# Patient Record
Sex: MALE | Race: OTHER | Employment: UNEMPLOYED | URBAN - METROPOLITAN AREA
[De-identification: names, ages, dates, MRNs, and addresses within clinical notes are randomized per-mention and may not be internally consistent; named-entity substitution may affect disease eponyms.]

---

## 2021-03-15 RX ORDER — ATORVASTATIN CALCIUM 40 MG/1
40 TABLET, FILM COATED ORAL NIGHTLY
COMMUNITY
End: 2021-03-15 | Stop reason: CLARIF

## 2021-03-15 RX ORDER — HYDROCHLOROTHIAZIDE 25 MG/1
25 TABLET ORAL DAILY
COMMUNITY
End: 2021-03-15 | Stop reason: CLARIF

## 2021-03-15 RX ORDER — WATER / MINERAL OIL / WHITE PETROLATUM 16 OZ
CREAM TOPICAL PRN
COMMUNITY
End: 2021-03-15 | Stop reason: CLARIF

## 2021-03-15 RX ORDER — METOPROLOL SUCCINATE 25 MG/1
25 TABLET, EXTENDED RELEASE ORAL DAILY
COMMUNITY
End: 2021-03-15 | Stop reason: CLARIF

## 2021-03-15 RX ORDER — HYDROCHLOROTHIAZIDE 25 MG/1
25 TABLET ORAL DAILY
Status: ON HOLD | COMMUNITY
End: 2022-07-21 | Stop reason: HOSPADM

## 2021-03-15 RX ORDER — METOPROLOL SUCCINATE 25 MG/1
25 TABLET, EXTENDED RELEASE ORAL DAILY
COMMUNITY

## 2021-03-15 RX ORDER — ATORVASTATIN CALCIUM 40 MG/1
40 TABLET, FILM COATED ORAL NIGHTLY
COMMUNITY

## 2021-03-15 RX ORDER — LOSARTAN POTASSIUM 100 MG/1
100 TABLET ORAL DAILY
COMMUNITY

## 2021-03-15 RX ORDER — LOSARTAN POTASSIUM 100 MG/1
100 TABLET ORAL DAILY
COMMUNITY
End: 2021-03-15 | Stop reason: CLARIF

## 2021-03-15 RX ORDER — WATER / MINERAL OIL / WHITE PETROLATUM 16 OZ
CREAM TOPICAL PRN
COMMUNITY

## 2021-03-15 RX ORDER — ASPIRIN 81 MG/1
81 TABLET ORAL DAILY
COMMUNITY
End: 2021-03-15 | Stop reason: CLARIF

## 2021-03-15 RX ORDER — ASPIRIN 81 MG/1
81 TABLET ORAL DAILY
COMMUNITY

## 2021-04-12 ENCOUNTER — OFFICE VISIT (OUTPATIENT)
Dept: CARDIOLOGY CLINIC | Age: 61
End: 2021-04-12
Payer: MEDICARE

## 2021-04-12 VITALS
HEIGHT: 70 IN | WEIGHT: 315 LBS | HEART RATE: 94 BPM | SYSTOLIC BLOOD PRESSURE: 130 MMHG | BODY MASS INDEX: 45.1 KG/M2 | DIASTOLIC BLOOD PRESSURE: 80 MMHG

## 2021-04-12 DIAGNOSIS — I10 ESSENTIAL HYPERTENSION: ICD-10-CM

## 2021-04-12 DIAGNOSIS — R06.09 DOE (DYSPNEA ON EXERTION): ICD-10-CM

## 2021-04-12 DIAGNOSIS — I20.9 ANGINA PECTORIS (HCC): ICD-10-CM

## 2021-04-12 DIAGNOSIS — I49.9 IRREGULAR HEARTBEAT: Primary | ICD-10-CM

## 2021-04-12 PROCEDURE — 93000 ELECTROCARDIOGRAM COMPLETE: CPT | Performed by: INTERNAL MEDICINE

## 2021-04-12 PROCEDURE — G8427 DOCREV CUR MEDS BY ELIG CLIN: HCPCS | Performed by: INTERNAL MEDICINE

## 2021-04-12 PROCEDURE — G8417 CALC BMI ABV UP PARAM F/U: HCPCS | Performed by: INTERNAL MEDICINE

## 2021-04-12 PROCEDURE — 99244 OFF/OP CNSLTJ NEW/EST MOD 40: CPT | Performed by: INTERNAL MEDICINE

## 2021-04-12 ASSESSMENT — ENCOUNTER SYMPTOMS
VOICE CHANGE: 0
WHEEZING: 0
APNEA: 0
CHEST TIGHTNESS: 0
ABDOMINAL DISTENTION: 0
COLOR CHANGE: 0
FACIAL SWELLING: 0
BLOOD IN STOOL: 0
TROUBLE SWALLOWING: 0
VOMITING: 0
SHORTNESS OF BREATH: 0
ANAL BLEEDING: 0
NAUSEA: 0

## 2021-04-12 NOTE — PROGRESS NOTES
[facility] CARDIOLOGY OFFICE FOLLOW-UP      Patient: Lennie Ling  YOB: 1960  MRN: [id number]    Chief Complaint:  Chief Complaint   Patient presents with   [facility] Cardiologist     Avis Greenberg REFERRING    Irregular Heart Beat         Subjective/HPI:  4/12/21: Patient presents today for evaluation of abnormal pulse. Morbidly obese gentleman. Who is hypertensive. Complains of shortness of breath predominantly because of morbid obesity. Does not smoke. He used to [employer]. Now does not do anything. He is not . He has 1-2 drinks a month of alcohol. Patient has lower extremity edema again which is probably most likely due to venous insufficiency. His BMI is 70 or greater denies any definite chest pain. But I  believe he is not very active. Past Medical History:   Diagnosis Date    Adult BMI >=70 kg/sq [facility])     Essential hypertension, malignant        No past surgical history on file. No family history on file.     Social History     Socioeconomic History    Marital status: Single     Spouse name: Not on file    Number of children: Not on file    Years of education: Not on file    Highest education level: Not on file   Occupational History    Not on file   Social Needs    Financial resource strain: Not on file    Food insecurity     Worry: Not on file     Inability: Not on file    Transportation needs     Medical: Not on file     Non-medical: Not on file   Tobacco Use    Smoking status: Never Smoker   Substance and Sexual Activity    Alcohol use: Yes     Comment: occasionally    Drug use: Not on file    Sexual activity: Not on file   Lifestyle    Physical activity     Days per week: Not on file     Minutes per session: Not on file    Stress: Not on file   Relationships    Social connections     Talks on phone: Not on file     Gets together: Not on file     Attends Yarsanism service: Not on file     Active member of club or organization: Not on file     Attends meetings of clubs or organizations: Not on file     Relationship status: Not on file    Intimate partner violence     Fear of current or ex partner: Not on file     Emotionally abused: Not on file     Physically abused: Not on file     Forced sexual activity: Not on file   Other Topics Concern    Not on file   Social History Narrative    Not on file       No Known Allergies    Current Outpatient Medications   Medication Sig Dispense Refill    Skin Protectants, Misc. (EUCERIN) cream Apply topically as needed for Dry Skin Apply topically as needed.  losartan (COZAAR) 100 MG tablet Take 100 mg by mouth daily      atorvastatin (LIPITOR) 40 MG tablet Take 40 mg by mouth nightly      aspirin 81 MG EC tablet Take 81 mg by mouth daily      metoprolol succinate (TOPROL XL) 25 MG extended release tablet Take 25 mg by mouth daily      fluocinonide (LIDEX) 0.05 % cream Apply topically 2 times daily Apply topically 2 times daily.  hydroCHLOROthiazide (HYDRODIURIL) 25 MG tablet Take 25 mg by mouth daily       No current facility-administered medications for this visit. Review of Systems:   Review of Systems   Constitutional: Negative for activity change, appetite change, diaphoresis, fatigue and unexpected weight change. HENT: Negative for facial swelling, nosebleeds, trouble swallowing and voice change. Respiratory: Negative for apnea, chest tightness, shortness of breath and wheezing. Cardiovascular: Negative for chest pain, palpitations and leg swelling. Gastrointestinal: Negative for abdominal distention, anal bleeding, blood in stool, nausea and vomiting. Genitourinary: Negative for decreased urine volume and dysuria. Musculoskeletal: Negative for gait problem and myalgias. Skin: Negative. Negative for color change, pallor, rash and wound. Neurological: Negative for dizziness, syncope, facial asymmetry, weakness, light-headedness, numbness and headaches. Hematological: Does not bruise/bleed easily. Psychiatric/Behavioral: Negative for agitation, behavioral problems, confusion, decreased concentration, hallucinations and suicidal ideas. The patient is not nervous/anxious and is not hyperactive. All other systems reviewed and are negative. Review of System is negative except for as mentioned above. Physical Examination:    /80 (Site: Left Wrist, Position: Sitting, Cuff Size: Large Adult)   Pulse 94   Ht 5' 10\" (1.778 m)   Wt (!) 502 lb (227.7 kg)   BMI 72.03 kg/m²    Physical Exam   Constitutional: He appears healthy. No distress. HENT:   Nose: Nose normal.   Mouth/Throat: Dentition is normal. Oropharynx is clear. Eyes: Pupils are equal, round, and reactive to light. Conjunctivae are normal.   Neck: Normal range of motion and thyroid normal. Neck supple. Cardiovascular: Regular rhythm, S1 normal, S2 normal, normal heart sounds, intact distal pulses and normal pulses. PMI is not displaced. No murmur heard. Pulmonary/Chest: He has no wheezes. He has no rales. He exhibits no tenderness. Abdominal: Soft. Bowel sounds are normal. He exhibits no distension and no mass. There is no splenomegaly or hepatomegaly. There is no abdominal tenderness. No hernia. Neurological: He is alert and oriented to person, place, and time. He has normal motor skills. Gait normal.   Skin: Skin is warm and dry. No cyanosis. No jaundice. Nails show no clubbing. There is no problem list on file for this patient. Orders Placed This Encounter   Procedures    NM MYOCARDIAL SPECT REST EXERCISE OR RX     Standing Status:   Future     Standing Expiration Date:   4/12/2022     Scheduling Instructions:      Perfecto baez is not able to walk the treadmill will need 2 days testing.      Order Specific Question:   Reason for Exam?     Answer:   Chest pain     Order Specific Question:   Reason for Exam?     Answer:   Shortness of breath     Order Electronically signed by: Angeles Ulloa MD  4/12/2021 2:51 PM

## 2021-11-13 ENCOUNTER — HOSPITAL ENCOUNTER (EMERGENCY)
Age: 61
Discharge: HOME | End: 2021-11-13
Payer: MEDICARE

## 2021-11-13 ENCOUNTER — APPOINTMENT (OUTPATIENT)
Dept: CT IMAGING | Age: 61
End: 2021-11-13
Payer: MEDICARE

## 2021-11-13 VITALS
SYSTOLIC BLOOD PRESSURE: 112 MMHG | RESPIRATION RATE: 20 BRPM | OXYGEN SATURATION: 99 % | HEART RATE: 82 BPM | BODY MASS INDEX: 45.1 KG/M2 | HEIGHT: 70 IN | DIASTOLIC BLOOD PRESSURE: 59 MMHG | WEIGHT: 315 LBS | TEMPERATURE: 97 F

## 2021-11-13 DIAGNOSIS — F19.951 DRUG-INDUCED HALLUCINOSIS (HCC): Primary | ICD-10-CM

## 2021-11-13 LAB
ACETAMINOPHEN LEVEL: <5 UG/ML (ref 10–30)
ALBUMIN SERPL-MCNC: 3.9 G/DL (ref 3.5–4.6)
ALP BLD-CCNC: 108 U/L (ref 35–104)
ALT SERPL-CCNC: 23 U/L (ref 0–41)
AMMONIA: 18 UMOL/L (ref 16–60)
AMPHETAMINE SCREEN, URINE: ABNORMAL
ANION GAP SERPL CALCULATED.3IONS-SCNC: 16 MEQ/L (ref 9–15)
AST SERPL-CCNC: 17 U/L (ref 0–40)
BARBITURATE SCREEN URINE: ABNORMAL
BASOPHILS ABSOLUTE: 0.1 K/UL (ref 0–0.2)
BASOPHILS RELATIVE PERCENT: 0.9 %
BENZODIAZEPINE SCREEN, URINE: ABNORMAL
BILIRUB SERPL-MCNC: 0.6 MG/DL (ref 0.2–0.7)
BILIRUBIN URINE: NEGATIVE
BLOOD, URINE: NEGATIVE
BUN BLDV-MCNC: 27 MG/DL (ref 8–23)
CALCIUM SERPL-MCNC: 9 MG/DL (ref 8.5–9.9)
CANNABINOID SCREEN URINE: POSITIVE
CHLORIDE BLD-SCNC: 99 MEQ/L (ref 95–107)
CLARITY: CLEAR
CO2: 23 MEQ/L (ref 20–31)
COCAINE METABOLITE SCREEN URINE: ABNORMAL
COLOR: YELLOW
CREAT SERPL-MCNC: 1.58 MG/DL (ref 0.7–1.2)
EOSINOPHILS ABSOLUTE: 0.1 K/UL (ref 0–0.7)
EOSINOPHILS RELATIVE PERCENT: 1.3 %
ETHANOL PERCENT: NORMAL G/DL
ETHANOL: <10 MG/DL (ref 0–0.08)
GFR AFRICAN AMERICAN: 53.9
GFR NON-AFRICAN AMERICAN: 44.5
GLOBULIN: 4.4 G/DL (ref 2.3–3.5)
GLUCOSE BLD-MCNC: 176 MG/DL (ref 70–99)
GLUCOSE URINE: NEGATIVE MG/DL
HCT VFR BLD CALC: 41.5 % (ref 42–52)
HEMOGLOBIN: 13.4 G/DL (ref 14–18)
KETONES, URINE: ABNORMAL MG/DL
LEUKOCYTE ESTERASE, URINE: NEGATIVE
LYMPHOCYTES ABSOLUTE: 1 K/UL (ref 1–4.8)
LYMPHOCYTES RELATIVE PERCENT: 8.8 %
Lab: ABNORMAL
MCH RBC QN AUTO: 28.4 PG (ref 27–31.3)
MCHC RBC AUTO-ENTMCNC: 32.2 % (ref 33–37)
MCV RBC AUTO: 88.1 FL (ref 80–100)
METHADONE SCREEN, URINE: ABNORMAL
MONOCYTES ABSOLUTE: 0.5 K/UL (ref 0.2–0.8)
MONOCYTES RELATIVE PERCENT: 4.2 %
NEUTROPHILS ABSOLUTE: 9.7 K/UL (ref 1.4–6.5)
NEUTROPHILS RELATIVE PERCENT: 84.8 %
NITRITE, URINE: NEGATIVE
OPIATE SCREEN URINE: ABNORMAL
OXYCODONE URINE: ABNORMAL
PDW BLD-RTO: 16.6 % (ref 11.5–14.5)
PH UA: 5 (ref 5–9)
PHENCYCLIDINE SCREEN URINE: ABNORMAL
PLATELET # BLD: 271 K/UL (ref 130–400)
POTASSIUM SERPL-SCNC: 4.2 MEQ/L (ref 3.4–4.9)
PROPOXYPHENE SCREEN: ABNORMAL
PROTEIN UA: ABNORMAL MG/DL
RBC # BLD: 4.71 M/UL (ref 4.7–6.1)
SALICYLATE, SERUM: <0.3 MG/DL (ref 15–30)
SODIUM BLD-SCNC: 138 MEQ/L (ref 135–144)
SPECIFIC GRAVITY UA: 1.02 (ref 1–1.03)
TOTAL CK: 182 U/L (ref 0–190)
TOTAL PROTEIN: 8.3 G/DL (ref 6.3–8)
TSH SERPL DL<=0.05 MIU/L-ACNC: 2.27 UIU/ML (ref 0.44–3.86)
URINE REFLEX TO CULTURE: ABNORMAL
UROBILINOGEN, URINE: 0.2 E.U./DL
WBC # BLD: 11.5 K/UL (ref 4.8–10.8)

## 2021-11-13 PROCEDURE — 6360000002 HC RX W HCPCS: Performed by: PHYSICIAN ASSISTANT

## 2021-11-13 PROCEDURE — 99283 EMERGENCY DEPT VISIT LOW MDM: CPT

## 2021-11-13 PROCEDURE — 80053 COMPREHEN METABOLIC PANEL: CPT

## 2021-11-13 PROCEDURE — 96374 THER/PROPH/DIAG INJ IV PUSH: CPT

## 2021-11-13 PROCEDURE — 84443 ASSAY THYROID STIM HORMONE: CPT

## 2021-11-13 PROCEDURE — 80143 DRUG ASSAY ACETAMINOPHEN: CPT

## 2021-11-13 PROCEDURE — 36415 COLL VENOUS BLD VENIPUNCTURE: CPT

## 2021-11-13 PROCEDURE — 80179 DRUG ASSAY SALICYLATE: CPT

## 2021-11-13 PROCEDURE — 82140 ASSAY OF AMMONIA: CPT

## 2021-11-13 PROCEDURE — 85025 COMPLETE CBC W/AUTO DIFF WBC: CPT

## 2021-11-13 PROCEDURE — 80307 DRUG TEST PRSMV CHEM ANLYZR: CPT

## 2021-11-13 PROCEDURE — 93005 ELECTROCARDIOGRAM TRACING: CPT | Performed by: PHYSICIAN ASSISTANT

## 2021-11-13 PROCEDURE — 82077 ASSAY SPEC XCP UR&BREATH IA: CPT

## 2021-11-13 PROCEDURE — 81003 URINALYSIS AUTO W/O SCOPE: CPT

## 2021-11-13 PROCEDURE — 82550 ASSAY OF CK (CPK): CPT

## 2021-11-13 PROCEDURE — 70450 CT HEAD/BRAIN W/O DYE: CPT

## 2021-11-13 RX ORDER — LORAZEPAM 2 MG/ML
2 INJECTION INTRAMUSCULAR ONCE
Status: COMPLETED | OUTPATIENT
Start: 2021-11-13 | End: 2021-11-13

## 2021-11-13 RX ADMIN — LORAZEPAM 2 MG: 2 INJECTION INTRAMUSCULAR; INTRAVENOUS at 06:53

## 2021-11-13 ASSESSMENT — ENCOUNTER SYMPTOMS
EYE DISCHARGE: 0
RHINORRHEA: 0
SHORTNESS OF BREATH: 0
COLOR CHANGE: 0
SORE THROAT: 0
ABDOMINAL DISTENTION: 0
CONSTIPATION: 0
ABDOMINAL PAIN: 0

## 2021-11-13 NOTE — ED NOTES
Pt sleeping in bed at this time. Nurse offered fluids and advised he need to provide a urine sample. Pt states he does not need to go at this time.      Omer Hammond RN  11/13/21 2343

## 2021-11-13 NOTE — ED PROVIDER NOTES
[facility] ED  eMERGENCY dEPARTMENT eNCOUnter      Pt Name: Bronwyn Mabry  MRN: [id number]  Armsmargaretgfjacinta 1960  Date of evaluation: 11/13/2021  Provider: Mark Cuenca PA-C    CHIEF COMPLAINT       Chief Complaint   Patient presents with    Psychiatric Evaluation         HISTORY OF PRESENT ILLNESS   (Location/Symptom, Timing/Onset,Context/Setting, Quality, Duration, Modifying Factors, Severity)  Note limiting factors. Bronwyn Mabry is a 56 y.o. male who presents to the emergency department with complaint of confusion, hallucinations, running around saying the devil is out to get him. Patient states that he has consumed approximately 10 beers, as well as 10 OxyContin pills, at a party he had at his home last evening. He states he is not trying to harm himself, he does not know why he consumed all the pills. Police states patient was banging on the neighbors door, stating that he was afraid the devil was going to get him. Therefore he was brought to the hospital for further psychiatric evaluation. HPI    NursingNotes were reviewed. REVIEW OF SYSTEMS    (2-9 systems for level 4, 10 or more for level 5)     Review of Systems   Constitutional: Negative for activity change and appetite change. HENT: Negative for congestion, ear discharge, ear pain, nosebleeds, rhinorrhea and sore throat. Eyes: Negative for discharge. Respiratory: Negative for shortness of breath. Cardiovascular: Negative for chest pain, palpitations and leg swelling. Gastrointestinal: Negative for abdominal distention, abdominal pain and constipation. Genitourinary: Negative for difficulty urinating and dysuria. Musculoskeletal: Negative for arthralgias. Skin: Negative for color change. Neurological: Negative for dizziness, syncope, numbness and headaches. Psychiatric/Behavioral: Positive for agitation, confusion and hallucinations. Negative for self-injury.        Except as noted above the remainder of the review of systems was reviewed and negative. PAST MEDICAL HISTORY     Past Medical History:   Diagnosis Date    Adult BMI >=70 kg/sq m [facility])     Essential hypertension, malignant          SURGICALHISTORY     No past surgical history on file. CURRENT MEDICATIONS       Discharge Medication List as of 11/13/2021 10:44 AM      CONTINUE these medications which have NOT CHANGED    Details   Skin Protectants, Misc. (EUCERIN) cream Apply topically as needed for Dry Skin Apply topically as needed., Topical, PRN, Historical Med      losartan (COZAAR) 100 MG tablet Take 100 mg by mouth dailyHistorical Med      atorvastatin (LIPITOR) 40 MG tablet Take 40 mg by mouth nightlyHistorical Med      aspirin 81 MG EC tablet Take 81 mg by mouth dailyHistorical Med      metoprolol succinate (TOPROL XL) 25 MG extended release tablet Take 25 mg by mouth dailyHistorical Med      fluocinonide (LIDEX) 0.05 % cream Apply topically 2 times daily Apply topically 2 times daily. , Topical, 2 TIMES DAILY, Historical Med      hydroCHLOROthiazide (HYDRODIURIL) 25 MG tablet Take 25 mg by mouth dailyHistorical Med             ALLERGIES     Patient has no known allergies. FAMILY HISTORY     No family history on file.        SOCIAL HISTORY       Social History     Socioeconomic History    Marital status: Single     Spouse name: Not on file    Number of children: Not on file    Years of education: Not on file    Highest education level: Not on file   Occupational History    Not on file   Tobacco Use    Smoking status: Never Smoker    Smokeless tobacco: Not on file   Substance and Sexual Activity    Alcohol use: Yes     Comment: occasionally    Drug use: Not on file    Sexual activity: Not on file   Other Topics Concern    Not on file   Social History Narrative    Not on file     Social Determinants of Health     Financial Resource Strain:     Difficulty of Paying Living Expenses: Not on file   Food Insecurity:  Worried About Running Out of Food in the Last Year: Not on file    Dary of Food in the Last Year: Not on file   Transportation Needs:     Lack of Transportation (Medical): Not on file    Lack of Transportation (Non-Medical): Not on file   Physical Activity:     Days of Exercise per Week: Not on file    Minutes of Exercise per Session: Not on file   Stress:     Feeling of Stress : Not on file   Social Connections:     Frequency of Communication with Friends and Family: Not on file    Frequency of Social Gatherings with Friends and Family: Not on file    Attends Adventist Services: Not on file    Active Member of [address] [place] [address] [employer] or Organizations: Not on file    Attends Club or Organization Meetings: Not on file    Marital Status: Not on file   Intimate Partner Violence:     Fear of Current or Ex-Partner: Not on file    Emotionally Abused: Not on file    Physically Abused: Not on file    Sexually Abused: Not on file   Housing Stability:     Unable to Pay for Housing in the Last Year: Not on file    Number of [place] in the Last Year: Not on file    Unstable Housing in the Last Year: Not on file       SCREENINGS      @FLOW([id number])@      PHYSICAL EXAM    (up to 7 for level 4, 8 or more for level 5)     ED Triage Vitals   BP Temp Temp src Pulse Resp SpO2 Height Weight   -- -- -- -- -- -- -- --       Physical Exam  Vitals and nursing note reviewed. Constitutional:       General: He is not in acute distress. Appearance: He is well-developed. He is not ill-appearing, toxic-appearing or diaphoretic. HENT:      Head: Normocephalic. Nose: No congestion. Mouth/Throat:      Mouth: Mucous membranes are moist.      Pharynx: No oropharyngeal exudate or posterior oropharyngeal erythema. Eyes:      Extraocular Movements: Extraocular movements intact. Conjunctiva/sclera: Conjunctivae normal.      Pupils: Pupils are equal, round, and reactive to light. Neck:      Vascular: No JVD. Trachea: No tracheal deviation. Cardiovascular:      Rate and Rhythm: Normal rate. Pulses: Normal pulses. Heart sounds: Normal heart sounds. No murmur heard. No friction rub. No gallop. Pulmonary:      Effort: Pulmonary effort is normal. No tachypnea, accessory muscle usage, respiratory distress or retractions. Breath sounds: No stridor. No wheezing, rhonchi or rales. Chest:      Chest wall: No tenderness. Abdominal:      General: Abdomen is flat. Bowel sounds are normal. There is no distension or abdominal bruit. Palpations: There is no shifting dullness, fluid wave, hepatomegaly, splenomegaly, mass or pulsatile mass. Tenderness: There is no abdominal tenderness. There is no right CVA tenderness, left CVA tenderness, guarding or rebound. Negative signs include Pina's sign, Rovsing's sign and McBurney's sign. Musculoskeletal:         General: No deformity. Cervical back: Normal range of motion and neck supple. No rigidity. Skin:     General: Skin is warm and dry. Capillary Refill: Capillary refill takes less than 2 seconds. Coloration: Skin is not jaundiced. Neurological:      General: No focal deficit present. Mental Status: He is alert. Cranial Nerves: No cranial nerve deficit. Sensory: No sensory deficit. Motor: No weakness. Coordination: Coordination normal.      Comments: Patient is alert, moving all extremities well, no facial droop, no slurring of speech, no drift upper or lower extremities. Psychiatric:         Mood and Affect: Mood normal.      Comments: Patient is alert, he is confused, states that people, mainly the double up to get him, he was found running around his home, trying to get away from the devil. Patient states he has consumed approximately 10 beers, and 10 OxyContin pills he cannot give me timeframe. He is tearful, yelling out, fearful, paranoid.          DIAGNOSTIC RESULTS     EKG: All EKG's are interpreted by the Emergency Department Physician who either signs or Co-signsthis chart in the absence of a cardiologist.    EKG shows sinus tachycardia at 103bpm there is for amount of artifact due to patient movement and agitation no appreciable ST segment abnormality no ventricular ectopy  ms    RADIOLOGY:   Non-plain filmimages such as CT, Ultrasound and MRI are read by the radiologist. Plain radiographic images are visualized and preliminarily interpreted by the emergency physician with the below findings:        Interpretation per the Radiologist below, if available at the time ofthis note:    CT Head WO Contrast   Final Result      No acute intracranial process. All CT scans at this facility use dose modulation, iterative reconstruction, and/or weight based dosing when appropriate to reduce radiation dose to as low as reasonably achievable.             ED BEDSIDE ULTRASOUND:   Performed by ED Physician - none    LABS:  Labs Reviewed   COMPREHENSIVE METABOLIC PANEL - Abnormal; Notable for the following components:       Result Value    Anion Gap 16 (*)     Glucose 176 (*)     BUN 27 (*)     CREATININE 1.58 (*)     GFR Non- 44.5 (*)     GFR  53.9 (*)     Total Protein 8.3 (*)     Alkaline Phosphatase 108 (*)     Globulin 4.4 (*)     All other components within normal limits   URINE DRUG SCREEN - Abnormal; Notable for the following components:    Cannabinoid Scrn, Ur POSITIVE (*)     All other components within normal limits   CBC WITH AUTO DIFFERENTIAL - Abnormal; Notable for the following components:    WBC 11.5 (*)     Hemoglobin 13.4 (*)     Hematocrit 41.5 (*)     MCHC 32.2 (*)     RDW 16.6 (*)     Neutrophils Absolute 9.7 (*)     All other components within normal limits   URINE RT REFLEX TO CULTURE - Abnormal; Notable for the following components:    Ketones, Urine TRACE (*)     Protein, UA TRACE (*)     All other components within normal limits ACETAMINOPHEN LEVEL - Abnormal; Notable for the following components:    Acetaminophen Level <5 (*)     All other components within normal limits   SALICYLATE LEVEL - Abnormal; Notable for the following components:    Salicylate, Serum <2.5 (*)     All other components within normal limits   COVID-19, RAPID   ETHANOL   CK   TSH WITHOUT REFLEX   AMMONIA       All other labs were within normal range or not returned as of this dictation. EMERGENCY DEPARTMENT COURSE and DIFFERENTIAL DIAGNOSIS/MDM:   Vitals:    Vitals:    11/13/21 0623 11/13/21 0730 11/13/21 0917   BP: 132/68 (!) 113/52 (!) 112/59   Pulse: 103  82   Resp: 18  20   Temp: 97 °F (36.1 °C)     TempSrc: Tympanic     SpO2: 98% 92% 99%   Weight: (!) 502 lb (227.7 kg)     Height: 5' 10\" (1.778 m)          MDM  Number of Diagnoses or Management Options  Drug-induced hallucinosis ([facility] [address]  Diagnosis management comments: Patient presented to ED with complaint of hallucinations, he states that devil was trying to get a hold of him, and he was fearful, he was found pounding on a neighbor's door, trying to get into get away from the devil. Patient admits to consuming large quantities of alcohol, as well as consuming OxyContin pills. He is initially confused, hallucinating on arrival to the emergency department, but after approximately 4 hours in the ED his symptoms have resolved, he is alert, he is oriented, he is no longer hallucinating, he was discharged from the emergency department complaint of drug-induced psychosis. ,  He is discharged in stable condition, he is advised to contact [facility] for further evaluation and management for substance abuse issues, he was also advised to contact his family physician for follow-up. If he has any worsening or change symptoms, he was advised to return to the ER. CRITICAL CARE TIME   Total Critical Care time was 0 minutes, excluding separately reportableprocedures.   There was a high probability of clinicallysignificant/life threatening deterioration in the patient's condition which required my urgent intervention. CONSULTS:  None    PROCEDURES:  Unless otherwise noted below, none     Procedures    FINAL IMPRESSION      1. Drug-induced hallucinosis [facility])          DISPOSITION/PLAN   DISPOSITION Decision To Discharge 11/13/2021 10:43:02 AM      PATIENT REFERRED TO:  [address] [place] [address].   [address] [place] [id number]  [phone]    In 2 days      [address] [facility] [address] [phone]  [facility] [address]  [phone]    In 2 days        DISCHARGE MEDICATIONS:  Discharge Medication List as of 11/13/2021 10:44 AM             (Please note that portions of this note were completed with a voice recognition program.  Efforts were made to edit the dictations but occasionally words are mis-transcribed.)    Gerardo Galdamez PA-C (electronically signed)  Attending Emergency Physician         Gerardo Galdamez PA-C  11/13/21 Chava Clay PA-C  11/15/21 0938

## 2021-11-13 NOTE — ED TRIAGE NOTES
Pt arrived to ED via ems from home. Per ems they received a  Call from pt stating the devil was chasing him and that he was running from the devil. Per ems pt stated that he had a party and he was smoking and drinking. Per ems pt states he had took pain medication. Pt states he doesn't remember how many he took he lost track of how many re took. Pt denies pain at this time. Pt breathing is unlabored.  Pt is alert and oriented x 3-4

## 2021-11-15 LAB
EKG ATRIAL RATE: 103 BPM
EKG P AXIS: 98 DEGREES
EKG P-R INTERVAL: 152 MS
EKG Q-T INTERVAL: 372 MS
EKG QRS DURATION: 100 MS
EKG QTC CALCULATION (BAZETT): 487 MS
EKG R AXIS: 82 DEGREES
EKG T AXIS: 27 DEGREES
EKG VENTRICULAR RATE: 103 BPM

## 2021-11-15 PROCEDURE — 93010 ELECTROCARDIOGRAM REPORT: CPT | Performed by: INTERNAL MEDICINE

## 2022-07-12 ENCOUNTER — HOSPITAL ENCOUNTER (INPATIENT)
Age: 62
LOS: 9 days | Discharge: HOME | DRG: 751 | End: 2022-07-21
Attending: STUDENT IN AN ORGANIZED HEALTH CARE EDUCATION/TRAINING PROGRAM | Admitting: STUDENT IN AN ORGANIZED HEALTH CARE EDUCATION/TRAINING PROGRAM
Payer: MEDICARE

## 2022-07-12 DIAGNOSIS — F29 PSYCHOSIS, UNSPECIFIED PSYCHOSIS TYPE (HCC): Primary | ICD-10-CM

## 2022-07-12 LAB
ACETAMINOPHEN LEVEL: <5 UG/ML (ref 10–30)
ALBUMIN SERPL-MCNC: 3.7 G/DL (ref 3.5–4.6)
ALP BLD-CCNC: 103 U/L (ref 35–104)
ALT SERPL-CCNC: 20 U/L (ref 0–41)
AMPHETAMINE SCREEN, URINE: ABNORMAL
ANION GAP SERPL CALCULATED.3IONS-SCNC: 15 MEQ/L (ref 9–15)
AST SERPL-CCNC: 19 U/L (ref 0–40)
BACTERIA: NEGATIVE /HPF
BARBITURATE SCREEN URINE: ABNORMAL
BASOPHILS ABSOLUTE: 0.1 K/UL (ref 0–0.2)
BASOPHILS RELATIVE PERCENT: 0.6 %
BENZODIAZEPINE SCREEN, URINE: ABNORMAL
BILIRUB SERPL-MCNC: 0.6 MG/DL (ref 0.2–0.7)
BILIRUBIN URINE: NEGATIVE
BLOOD, URINE: ABNORMAL
BUN BLDV-MCNC: 35 MG/DL (ref 8–23)
CALCIUM SERPL-MCNC: 8.9 MG/DL (ref 8.5–9.9)
CANNABINOID SCREEN URINE: POSITIVE
CHLORIDE BLD-SCNC: 100 MEQ/L (ref 95–107)
CHOLESTEROL, TOTAL: 120 MG/DL (ref 0–199)
CLARITY: CLEAR
CO2: 21 MEQ/L (ref 20–31)
COCAINE METABOLITE SCREEN URINE: ABNORMAL
COLOR: YELLOW
CREAT SERPL-MCNC: 1.68 MG/DL (ref 0.7–1.2)
EOSINOPHILS ABSOLUTE: 0 K/UL (ref 0–0.7)
EOSINOPHILS RELATIVE PERCENT: 0.3 %
EPITHELIAL CELLS, UA: ABNORMAL /HPF (ref 0–5)
ETHANOL PERCENT: NORMAL G/DL
ETHANOL: <10 MG/DL (ref 0–0.08)
FENTANYL SCREEN, URINE: ABNORMAL
GFR AFRICAN AMERICAN: 50.1
GFR NON-AFRICAN AMERICAN: 41.4
GLOBULIN: 4.1 G/DL (ref 2.3–3.5)
GLUCOSE BLD-MCNC: 123 MG/DL (ref 70–99)
GLUCOSE URINE: NEGATIVE MG/DL
HCT VFR BLD CALC: 36.6 % (ref 42–52)
HDLC SERPL-MCNC: 39 MG/DL (ref 40–59)
HEMOGLOBIN: 12.1 G/DL (ref 14–18)
HYALINE CASTS: ABNORMAL /HPF (ref 0–5)
KETONES, URINE: NEGATIVE MG/DL
LDL CHOLESTEROL CALCULATED: 63 MG/DL (ref 0–129)
LEUKOCYTE ESTERASE, URINE: NEGATIVE
LYMPHOCYTES ABSOLUTE: 0.8 K/UL (ref 1–4.8)
LYMPHOCYTES RELATIVE PERCENT: 7.3 %
Lab: ABNORMAL
MCH RBC QN AUTO: 30.2 PG (ref 27–31.3)
MCHC RBC AUTO-ENTMCNC: 33 % (ref 33–37)
MCV RBC AUTO: 91.5 FL (ref 80–100)
METHADONE SCREEN, URINE: ABNORMAL
MONOCYTES ABSOLUTE: 0.7 K/UL (ref 0.2–0.8)
MONOCYTES RELATIVE PERCENT: 6.6 %
NEUTROPHILS ABSOLUTE: 9.1 K/UL (ref 1.4–6.5)
NEUTROPHILS RELATIVE PERCENT: 85.2 %
NITRITE, URINE: NEGATIVE
OPIATE SCREEN URINE: ABNORMAL
OXYCODONE URINE: ABNORMAL
PDW BLD-RTO: 15.2 % (ref 11.5–14.5)
PH UA: 5 (ref 5–9)
PHENCYCLIDINE SCREEN URINE: ABNORMAL
PLATELET # BLD: 221 K/UL (ref 130–400)
POTASSIUM SERPL-SCNC: 3.7 MEQ/L (ref 3.4–4.9)
PROPOXYPHENE SCREEN: ABNORMAL
PROTEIN UA: ABNORMAL MG/DL
RBC # BLD: 4.01 M/UL (ref 4.7–6.1)
RBC UA: ABNORMAL /HPF (ref 0–5)
SALICYLATE, SERUM: <0.3 MG/DL (ref 15–30)
SARS-COV-2, NAAT: NOT DETECTED
SODIUM BLD-SCNC: 136 MEQ/L (ref 135–144)
SPECIFIC GRAVITY UA: 1.02 (ref 1–1.03)
TOTAL CK: 321 U/L (ref 0–190)
TOTAL PROTEIN: 7.8 G/DL (ref 6.3–8)
TRIGL SERPL-MCNC: 89 MG/DL (ref 0–150)
TSH SERPL DL<=0.05 MIU/L-ACNC: 1.76 UIU/ML (ref 0.44–3.86)
URINE REFLEX TO CULTURE: ABNORMAL
UROBILINOGEN, URINE: 0.2 E.U./DL
WBC # BLD: 10.7 K/UL (ref 4.8–10.8)
WBC UA: ABNORMAL /HPF (ref 0–5)

## 2022-07-12 PROCEDURE — 6370000000 HC RX 637 (ALT 250 FOR IP)

## 2022-07-12 PROCEDURE — 87635 SARS-COV-2 COVID-19 AMP PRB: CPT

## 2022-07-12 PROCEDURE — 85025 COMPLETE CBC W/AUTO DIFF WBC: CPT

## 2022-07-12 PROCEDURE — 80053 COMPREHEN METABOLIC PANEL: CPT

## 2022-07-12 PROCEDURE — 2580000003 HC RX 258

## 2022-07-12 PROCEDURE — 6360000002 HC RX W HCPCS

## 2022-07-12 PROCEDURE — 1240000000 HC EMOTIONAL WELLNESS R&B

## 2022-07-12 PROCEDURE — 80143 DRUG ASSAY ACETAMINOPHEN: CPT

## 2022-07-12 PROCEDURE — 96372 THER/PROPH/DIAG INJ SC/IM: CPT

## 2022-07-12 PROCEDURE — 80307 DRUG TEST PRSMV CHEM ANLYZR: CPT

## 2022-07-12 PROCEDURE — 84443 ASSAY THYROID STIM HORMONE: CPT

## 2022-07-12 PROCEDURE — 80179 DRUG ASSAY SALICYLATE: CPT

## 2022-07-12 PROCEDURE — 36415 COLL VENOUS BLD VENIPUNCTURE: CPT

## 2022-07-12 PROCEDURE — 81001 URINALYSIS AUTO W/SCOPE: CPT

## 2022-07-12 PROCEDURE — 99285 EMERGENCY DEPT VISIT HI MDM: CPT

## 2022-07-12 PROCEDURE — 82550 ASSAY OF CK (CPK): CPT

## 2022-07-12 PROCEDURE — 82077 ASSAY SPEC XCP UR&BREATH IA: CPT

## 2022-07-12 PROCEDURE — 93005 ELECTROCARDIOGRAM TRACING: CPT

## 2022-07-12 PROCEDURE — 80061 LIPID PANEL: CPT

## 2022-07-12 RX ORDER — POLYETHYLENE GLYCOL 3350 17 G/17G
17 POWDER, FOR SOLUTION ORAL DAILY PRN
Status: DISCONTINUED | OUTPATIENT
Start: 2022-07-12 | End: 2022-07-21 | Stop reason: HOSPADM

## 2022-07-12 RX ORDER — HALOPERIDOL 5 MG
5 TABLET ORAL EVERY 6 HOURS PRN
Status: DISCONTINUED | OUTPATIENT
Start: 2022-07-12 | End: 2022-07-21 | Stop reason: HOSPADM

## 2022-07-12 RX ORDER — HYDROXYZINE HYDROCHLORIDE 50 MG/ML
50 INJECTION, SOLUTION INTRAMUSCULAR EVERY 6 HOURS PRN
Status: DISCONTINUED | OUTPATIENT
Start: 2022-07-12 | End: 2022-07-21 | Stop reason: HOSPADM

## 2022-07-12 RX ORDER — MAGNESIUM HYDROXIDE/ALUMINUM HYDROXICE/SIMETHICONE 120; 1200; 1200 MG/30ML; MG/30ML; MG/30ML
30 SUSPENSION ORAL EVERY 6 HOURS PRN
Status: DISCONTINUED | OUTPATIENT
Start: 2022-07-12 | End: 2022-07-21 | Stop reason: HOSPADM

## 2022-07-12 RX ORDER — LORAZEPAM 1 MG/1
2 TABLET ORAL ONCE
Status: COMPLETED | OUTPATIENT
Start: 2022-07-12 | End: 2022-07-12

## 2022-07-12 RX ORDER — ACETAMINOPHEN 325 MG/1
650 TABLET ORAL EVERY 4 HOURS PRN
Status: DISCONTINUED | OUTPATIENT
Start: 2022-07-12 | End: 2022-07-21 | Stop reason: HOSPADM

## 2022-07-12 RX ORDER — DIPHENHYDRAMINE HYDROCHLORIDE 50 MG/ML
50 INJECTION INTRAMUSCULAR; INTRAVENOUS ONCE
Status: COMPLETED | OUTPATIENT
Start: 2022-07-12 | End: 2022-07-12

## 2022-07-12 RX ORDER — HYDROXYZINE PAMOATE 50 MG/1
50 CAPSULE ORAL EVERY 6 HOURS PRN
Status: DISCONTINUED | OUTPATIENT
Start: 2022-07-12 | End: 2022-07-21 | Stop reason: HOSPADM

## 2022-07-12 RX ORDER — BENZTROPINE MESYLATE 1 MG/ML
2 INJECTION INTRAMUSCULAR; INTRAVENOUS 2 TIMES DAILY PRN
Status: DISCONTINUED | OUTPATIENT
Start: 2022-07-12 | End: 2022-07-21 | Stop reason: HOSPADM

## 2022-07-12 RX ORDER — HALOPERIDOL 5 MG/ML
5 INJECTION INTRAMUSCULAR EVERY 6 HOURS PRN
Status: DISCONTINUED | OUTPATIENT
Start: 2022-07-12 | End: 2022-07-21 | Stop reason: HOSPADM

## 2022-07-12 RX ORDER — ZIPRASIDONE MESYLATE 20 MG/ML
20 INJECTION, POWDER, LYOPHILIZED, FOR SOLUTION INTRAMUSCULAR ONCE
Status: COMPLETED | OUTPATIENT
Start: 2022-07-12 | End: 2022-07-12

## 2022-07-12 RX ORDER — TRAZODONE HYDROCHLORIDE 50 MG/1
50 TABLET ORAL NIGHTLY PRN
Status: DISCONTINUED | OUTPATIENT
Start: 2022-07-12 | End: 2022-07-21 | Stop reason: HOSPADM

## 2022-07-12 RX ADMIN — LORAZEPAM 2 MG: 1 TABLET ORAL at 11:13

## 2022-07-12 RX ADMIN — ZIPRASIDONE MESYLATE 20 MG: 20 INJECTION, POWDER, LYOPHILIZED, FOR SOLUTION INTRAMUSCULAR at 03:30

## 2022-07-12 RX ADMIN — DIPHENHYDRAMINE HYDROCHLORIDE 50 MG: 50 INJECTION, SOLUTION INTRAMUSCULAR; INTRAVENOUS at 03:30

## 2022-07-12 RX ADMIN — WATER 1.2 ML: 1 INJECTION INTRAMUSCULAR; INTRAVENOUS; SUBCUTANEOUS at 03:30

## 2022-07-12 ASSESSMENT — PAIN DESCRIPTION - LOCATION: LOCATION: BACK

## 2022-07-12 ASSESSMENT — PAIN DESCRIPTION - DESCRIPTORS: DESCRIPTORS: BURNING;PRESSURE

## 2022-07-12 ASSESSMENT — PAIN SCALES - GENERAL: PAINLEVEL_OUTOF10: 6

## 2022-07-12 ASSESSMENT — PAIN DESCRIPTION - PAIN TYPE: TYPE: ACUTE PAIN

## 2022-07-12 ASSESSMENT — PAIN - FUNCTIONAL ASSESSMENT: PAIN_FUNCTIONAL_ASSESSMENT: 0-10

## 2022-07-12 NOTE — PROGRESS NOTES
Patient arrived on [address] accompanied by Morris Jacob. Clothing and skin check completed with this writer and Elzbieta Floyd. Patient has many areas of skin breakdown and has a h/o Psoriasis and seems to have poor self care. Arms and low back are especially irritated. Toes have long nail and would benefit by F/U from Podiatry. Patient displays disorganized speech. He is wearing his own boxers and ties were cut. Patient is morbidly obese. He is cooperative but sleepy and slightly unsteady on his feet. Assisted to bed, and fell fast asleep.
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
## 2022-07-12 NOTE — FLOWSHEET NOTE
Patient pacing back and forth, agreed to take ativan as ordered by mouth. Cooperative, now resting in bed with eyes closed.

## 2022-07-12 NOTE — ED NOTES
Pt in bed, appears to be asleep, no s/s of distress noted     Quin Watts RN  07/12/22 7507       Quin Watts RN  07/12/22 6168
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
## 2022-07-12 NOTE — PROGRESS NOTES
Patient did not attend wellness group despite encouragement by staff. Electronically signed by Gold Porter on 7/12/2022 at 1:58 PM
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
## 2022-07-12 NOTE — ED NOTES
Breakfast tray given. Patient sat up in bed & started eating with no complaints voiced.      Luis Gutiérrez RN  07/12/22 4678

## 2022-07-12 NOTE — ED NOTES
Awaiting assessment per report from [facility]. Resting with eyes closed & no acute distress noted.      Gretchen Gillette RN  07/12/22 6450

## 2022-07-12 NOTE — ED TRIAGE NOTES
Patient brought in by EMS, patient states he is here to have his psoriasis checked. Per EMS pt was wandering around outside and has AMS. Pt is A&O x4, answering all questions appropriately.
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
## 2022-07-12 NOTE — ED NOTES
Patient is laying in bed. Continues to be selectively mute. Difficult to understand as when he does talk it is soft and mumbled. Patient appears to be thought blocking. Possibly internally stimulated as eyes are observed to be darting around the room. No s/s of distress noted at this time.       Mayuri Somers RN  07/12/22 8359

## 2022-07-12 NOTE — PROGRESS NOTES
Patient remains asleep. Admission deferred at this time. Snoring respirations with brief episodes of gasping resembling sleep apnia. Patient is reportedly 5'10'' and 420 pounds.
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
## 2022-07-12 NOTE — ED NOTES
[facility] notified of 3 WT escort & Patient belongings     Ellis Draper RN  07/12/22 0202

## 2022-07-12 NOTE — PROGRESS NOTES
Patient remains asleep. He appears to have irregular breathing and displays symptoms of sleep apnea.

## 2022-07-13 LAB
EKG ATRIAL RATE: 89 BPM
EKG P AXIS: 38 DEGREES
EKG P-R INTERVAL: 156 MS
EKG Q-T INTERVAL: 386 MS
EKG QRS DURATION: 108 MS
EKG QTC CALCULATION (BAZETT): 469 MS
EKG R AXIS: 76 DEGREES
EKG T AXIS: 43 DEGREES
EKG VENTRICULAR RATE: 89 BPM

## 2022-07-13 PROCEDURE — 99223 1ST HOSP IP/OBS HIGH 75: CPT | Performed by: STUDENT IN AN ORGANIZED HEALTH CARE EDUCATION/TRAINING PROGRAM

## 2022-07-13 PROCEDURE — 0HBRXZZ EXCISION OF TOE NAIL, EXTERNAL APPROACH: ICD-10-PCS

## 2022-07-13 PROCEDURE — 6370000000 HC RX 637 (ALT 250 FOR IP): Performed by: STUDENT IN AN ORGANIZED HEALTH CARE EDUCATION/TRAINING PROGRAM

## 2022-07-13 PROCEDURE — 1240000000 HC EMOTIONAL WELLNESS R&B

## 2022-07-13 PROCEDURE — 6370000000 HC RX 637 (ALT 250 FOR IP): Performed by: INTERNAL MEDICINE

## 2022-07-13 RX ORDER — ASPIRIN 81 MG/1
81 TABLET ORAL DAILY
Status: DISCONTINUED | OUTPATIENT
Start: 2022-07-13 | End: 2022-07-21 | Stop reason: HOSPADM

## 2022-07-13 RX ORDER — RISPERIDONE 1 MG/1
1 TABLET, ORALLY DISINTEGRATING ORAL 2 TIMES DAILY
Status: DISCONTINUED | OUTPATIENT
Start: 2022-07-13 | End: 2022-07-14

## 2022-07-13 RX ORDER — LANOLIN ALCOHOL/MO/W.PET/CERES
CREAM (GRAM) TOPICAL PRN
Status: DISCONTINUED | OUTPATIENT
Start: 2022-07-13 | End: 2022-07-21 | Stop reason: HOSPADM

## 2022-07-13 RX ORDER — METOPROLOL SUCCINATE 25 MG/1
25 TABLET, EXTENDED RELEASE ORAL DAILY
Status: DISCONTINUED | OUTPATIENT
Start: 2022-07-13 | End: 2022-07-21 | Stop reason: HOSPADM

## 2022-07-13 RX ORDER — LOSARTAN POTASSIUM 50 MG/1
100 TABLET ORAL DAILY
Status: DISCONTINUED | OUTPATIENT
Start: 2022-07-13 | End: 2022-07-21 | Stop reason: HOSPADM

## 2022-07-13 RX ORDER — ATORVASTATIN CALCIUM 40 MG/1
40 TABLET, FILM COATED ORAL NIGHTLY
Status: DISCONTINUED | OUTPATIENT
Start: 2022-07-13 | End: 2022-07-21 | Stop reason: HOSPADM

## 2022-07-13 RX ADMIN — RISPERIDONE 1 MG: 1 TABLET, ORALLY DISINTEGRATING ORAL at 21:15

## 2022-07-13 RX ADMIN — Medication: at 21:16

## 2022-07-13 RX ADMIN — RISPERIDONE 1 MG: 1 TABLET, ORALLY DISINTEGRATING ORAL at 10:36

## 2022-07-13 RX ADMIN — HALOPERIDOL 5 MG: 5 TABLET ORAL at 22:21

## 2022-07-13 RX ADMIN — HYDROXYZINE PAMOATE 50 MG: 50 CAPSULE ORAL at 22:21

## 2022-07-13 RX ADMIN — TRAZODONE HYDROCHLORIDE 50 MG: 50 TABLET ORAL at 21:15

## 2022-07-13 RX ADMIN — ATORVASTATIN CALCIUM 40 MG: 40 TABLET, FILM COATED ORAL at 21:15

## 2022-07-13 RX ADMIN — ACETAMINOPHEN 650 MG: 325 TABLET ORAL at 10:39

## 2022-07-13 ASSESSMENT — PAIN SCALES - GENERAL: PAINLEVEL_OUTOF10: 0

## 2022-07-13 ASSESSMENT — LIFESTYLE VARIABLES
HOW MANY STANDARD DRINKS CONTAINING ALCOHOL DO YOU HAVE ON A TYPICAL DAY: 1 OR 2
HOW OFTEN DO YOU HAVE A DRINK CONTAINING ALCOHOL: MONTHLY OR LESS

## 2022-07-13 ASSESSMENT — SLEEP AND FATIGUE QUESTIONNAIRES
DO YOU USE A SLEEP AID: NO
AVERAGE NUMBER OF SLEEP HOURS: 6
DO YOU HAVE DIFFICULTY SLEEPING: YES

## 2022-07-13 ASSESSMENT — PAIN DESCRIPTION - LOCATION: LOCATION: OTHER (COMMENT)

## 2022-07-13 ASSESSMENT — PAIN DESCRIPTION - DESCRIPTORS: DESCRIPTORS: ACHING

## 2022-07-13 NOTE — GROUP NOTE
Group Therapy Note    Date: 7/13/2022    Group Start Time: 1350  Group End Time: 1430  Group Topic: Cognitive Skills    [facility]    TIAGO Anaya        Group Therapy Note    Attendees: 9         Patient's Goal: To participate in mood management group. Notes:  Patient learned to be well-minded. Status After Intervention:  Unchanged    Participation Level: Monopolizing    Participation Quality: Appropriate, Attentive and Intrusive      Speech:  loud      Thought Process/Content: Flight of ideas      Affective Functioning: Congruent      Mood: elevated      Level of consciousness:  Alert      Response to Learning: Able to verbalize current knowledge/experience      Endings: None Reported    Modes of Intervention: Education      Discipline Responsible: /Counselor      Signature:   TIAGO Anaya

## 2022-07-13 NOTE — PROGRESS NOTES
Spoke with father tonight. Father said this type of behavior, delusionsal, is relatively new; over the past year or so. Dad also reports patient to be a heavy marijuana user. Patient is out and social. Good eye contact. Still holds on to thinking that Pia Funk is coming to visit and play cards with him. Does not endorse SI, HI or AVH. Denies anxiety and depression.

## 2022-07-13 NOTE — PROGRESS NOTES
Patient did not attend group despite staff encouragement.   Electronically signed by Alla Overton on 7/13/2022 at 11:55 AM
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
## 2022-07-13 NOTE — PROGRESS NOTES
Pt has remained in room resting in bed with eyes closed throughout shift. Admission assessment deferred to allow pt to rest. Spontaneous movements observed. Snoring respirations audbile. Pt extremely malodorous. VS obtained and pt is cooperative. Pt tells staff he is here only for his psoriasis and is asking if he is allowed to leave. Oriented to time and place and advised he will need evaluation. Pt returns to resting d/t current time of 0100.
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
## 2022-07-13 NOTE — CARE COORDINATION
Brief Intervention and Referral to Treatment Summary    Patient was provided PHQ-9, AUDIT-C and DAST Screening:      PHQ-9 Score: 0  AUDIT-C Score: 1  DAST Score:  1    Patients substance use is considered     Low Risk/Healthy x  Moderate Risk  Harmful  Dependent    Patients depression is considered:     Minimal x  Mild   Moderate  Moderately Severe  Severe    Brief Education Was Provided N/A    Patient was receptive  Patient was not receptive      Brief Intervention Is Provided (Only for AUDIT-C or DAST) N/A    Patient reports readiness to decrease and/or stop use and a plan was discussed   Patient denies readiness to decrease and/or stop use and a plan was not discussed      Recommendations/Referrals for Brief and/or Specialized Treatment Provided to Patient   Patient denied any current difficulties with drugs or alcohol. He stated he smokes weed periodically but does not see it as a problem. As a result, no brief education or intervention was completed.     Electronically signed by Katherine Cade on 7/13/2022 at 12:57 PM

## 2022-07-13 NOTE — GROUP NOTE
Group Therapy Note    Date: 7/13/2022    Group Start Time: 1300  Group End Time: 1400  Group Topic: Healthy Living/Wellness    [facility]    Kym Tan RN        Group Therapy Note    Attendees: 6         Patient's Goal: Engage in group game to improve mood and use it as a coping skill    Notes:  Pt actively and appropriately participated in group game    Status After Intervention:  Unchanged    Participation Level:  Active Listener and Interactive    Participation Quality: Appropriate, Attentive, Sharing and Supportive      Speech:  normal      Thought Process/Content: Logical  Linear      Affective Functioning: Congruent      Mood: euthymic      Level of consciousness:  Alert and Oriented x4      Response to Learning: Able to verbalize current knowledge/experience, Able to verbalize/acknowledge new learning and Able to retain information      Endings: None Reported    Modes of Intervention: Education, Support, Socialization and Exploration      Discipline Responsible: Registered Nurse      Signature:  Kym Tan RN

## 2022-07-13 NOTE — CONSULTS
Hospitalist Group   Consult for Medical Management      Reason for Consult: NP, medical management of hypertension. History of Present Illness:  59 y.o. male with a history of psoriasis, hypertension, CKD stage III, morbid obesity presents with psychosis. Patient kept on complaining of his weight. He said that he is short of breath because of his weight. He said that he needs to exercise. He denies chest pain, nausea, vomiting, abdominal pain. No bowel movement changes or urinary symptoms. He said that he takes medication for blood pressure and he has been taking all of his medication. He has history of psoriasis and he uses creams for it. He denies any other complaints. REVIEW OF SYSTEMS:  Complete ROS performed with patient, pertinent positives and negatives are listed in the HPI. PMH:  Past Medical History:   Diagnosis Date    Adult BMI >=70 kg/sq m ([facility] [address]     Essential hypertension, malignant        Surgical History:  History reviewed. No pertinent surgical history. Medications Prior to Admission:    Prior to Admission medications    Medication Sig Start Date End Date Taking? Authorizing Provider   Skin Protectants, Misc. (EUCERIN) cream Apply topically as needed for Dry Skin Apply topically as needed. Historical Provider, MD   losartan (COZAAR) 100 MG tablet Take 100 mg by mouth daily    Historical Provider, MD   atorvastatin (LIPITOR) 40 MG tablet Take 40 mg by mouth nightly    Historical Provider, MD   aspirin 81 MG EC tablet Take 81 mg by mouth daily    Historical Provider, MD   metoprolol succinate (TOPROL XL) 25 MG extended release tablet Take 25 mg by mouth daily    Historical Provider, MD   fluocinonide (LIDEX) 0.05 % cream Apply topically 2 times daily Apply topically 2 times daily.     Historical Provider, MD   hydroCHLOROthiazide (HYDRODIURIL) 25 MG tablet Take 25 mg by mouth daily    Historical Provider, MD       Allergies:    Patient has no known 04:34 AM    HGB 12.1 07/12/2022 04:34 AM    HCT 36.6 07/12/2022 04:34 AM     07/12/2022 04:34 AM    MCV 91.5 07/12/2022 04:34 AM    MCH 30.2 07/12/2022 04:34 AM    MCHC 33.0 07/12/2022 04:34 AM    RDW 15.2 07/12/2022 04:34 AM    LYMPHOPCT 7.3 07/12/2022 04:34 AM    MONOPCT 6.6 07/12/2022 04:34 AM    BASOPCT 0.6 07/12/2022 04:34 AM    MONOSABS 0.7 07/12/2022 04:34 AM    LYMPHSABS 0.8 07/12/2022 04:34 AM    EOSABS 0.0 07/12/2022 04:34 AM    BASOSABS 0.1 07/12/2022 04:34 AM     CMP:    Lab Results   Component Value Date/Time     07/12/2022 04:34 AM    K 3.7 07/12/2022 04:34 AM     07/12/2022 04:34 AM    CO2 21 07/12/2022 04:34 AM    BUN 35 07/12/2022 04:34 AM    CREATININE 1.68 07/12/2022 04:34 AM    GFRAA 50.1 07/12/2022 04:34 AM    LABGLOM 41.4 07/12/2022 04:34 AM    GLUCOSE 123 07/12/2022 04:34 AM    PROT 7.8 07/12/2022 04:34 AM    LABALBU 3.7 07/12/2022 04:34 AM    CALCIUM 8.9 07/12/2022 04:34 AM    BILITOT 0.6 07/12/2022 04:34 AM    ALKPHOS 103 07/12/2022 04:34 AM    AST 19 07/12/2022 04:34 AM    ALT 20 07/12/2022 04:34 AM       Radiology:   No orders to display       EKG:   Normal sinus rhythm with     ASSESSMENT/ PLAN:    1. Psychosis- managed by psychiatrist on the psychiatry floor  2. Hypertension- blood pressure is not significantly high despite patient not being on any blood pressure medication. Resume home medication including losartan, Toprol. Hold hydrochlorothiazide for now and resume of patient blood pressure is not well controlled. Monitor blood pressure closely  3. Psoriasis- resume patient's topical creams. Wound care  4. Hyperlipidemia- is on Lipitor  5. Morbid obesity- outpatient management. Electronically signed by John Granados MD on 7/13/2022 at 11:29 AM    NOTE: This report was transcribed using voice recognition software. Every effort was made to ensure accuracy; however, inadvertent computerized transcription errors may be present.

## 2022-07-13 NOTE — PROGRESS NOTES
Message sent to covering MD Dr. Raymon Marie regarding patients compromised skin- the need for wound consultation and podiatry. Patient has overgrown, painful toenails.

## 2022-07-13 NOTE — PROGRESS NOTES
Patient had a flat affect, was worrisome, anxious, paranoid and suspicious. Patient wanted this writer to sit down close to him to do the leisure assessment. Redirection was given to patient about staying 6 feet apart. Patient has a difficult time focusing and staying on the topic of the questions. He has some delusional thinking, stating he won the lottery to 21.7 million dollars and he was playing cards when he saw Dayan Jennifer there. Patient stated he has some relationship issues with a darin but would not elaborate. Patient denies being depressed or stressed and came to the hospital because of his psoriasis. Patient has poor ADL's. Patient denies any auditory or visual hallucinations. He enjoys watching football.  Electronically signed by Ben Nicole, [address] on 7/13/2022 at 3:09 PM

## 2022-07-13 NOTE — PROGRESS NOTES
Patient signed consents without difficulty.  Helped patient use phone to call his Dad per his request.

## 2022-07-13 NOTE — PROGRESS NOTES
Morning 2 [facility] Natalie attended the morning [facility] meeting on 7/13/22. Topics discussed today     [x] Introduction   Day of the week and date   Mask distribution   Current mask requirements  [x]Teams   Explanation of  Green and Blue team criteria   Nurses assigned to each team for today   Explanation about green and blue paper  o Date  o Patient's Name  o Patient's Nurse  o Goals  [x] Visitation   Announce the visiting hours for the day   Announce which team is allowed to have visitors for the day   Review any updated Covid 19 requirements for visitors during visitation  o Vaccine Card or negative Covid test within 48 hours of visit  o State Identification   Patients are reminded to alert the  at least 1 hour before visitation   [x] Unit Orientation   Coffee use   Phone location and etiquette   Shower locations  [employer] and dryer location and process   Common area expectations   Staff rounds expectation  [x] Meals    Educate patient to the menu  o The patient is encouraged to fill out the menu to get preferences at mealtime  o The patient is educated that if they do not fill out the menu, they will get the standard tray  o The coffee pot is decaf, patient encouraged to order regular coffee from menu.    Educate patient to the meal process   Patient encouraged to eat snacks provided twice daily  o Snacks may stay in patient room     [x] Discharge Process   Discharge expectations   Fill out the survey after discharge   [x] Hygiene   Daily showers encouraged  o Showers availability discussed    Daily dressing encouraged  o Discussed wearing street clothing   Education provided on where to place linens and clothing  o Linens in the hamper  o personal clothing does not go into the linen hamper  [x] Group    Patient encouraged to attend group provided   Time of Group Meetings discussed   Gentle reminder that attendance is a Physician order  [x] Notified Holli prescription for Diflucan 150mg sent to [facility] in [place].   Movement   Chair exercises completed   Stretching completed  Notes: Goal - \"Have the Doctor look at my psoriasis\"Electronically signed by Carlo Castellano, [address] on 7/13/2022 at 9:28 AM

## 2022-07-13 NOTE — CONSULTS
PODIATRIC MEDICINE AND SURGERY  CONSULT HISTORY AND PHYSICAL    Consulting Service:  Psych  Requesting Provider: Princess Evelyn MD  Opinion/advice regarding: Nail care  Staff Doctor:  Katie Myers DPM    ASSESSMENT: 59 y.o. male with PMH significant for HTN and psoriasis who presented to the ED 7/12 with multiple complaints. Became physically aggressive with ED staff and police before being admitted for psychiatric evaluation. On initial exam, patient noted to be pleasant and cooperative. Denies any pedal issues other than elongated toenails. Bilateral pedal pulses are palpable. Skin is mildly xerotic. Web spaces are clean and dry without debris. No lesions or open wounds appreciated. Toenails 1-10 are thickened, elongated, and discolored. Sensation intact with no other acute pedal issues to address at this time. PLAN AND RECOMMENDATIONS[de-identified]  - Patient's case to be discussed with staff, Dr. Cristopher Halsted, who will provide final recommendations going forward  - Nails 1-10 debrided in length and thickness without incident  - Patient may follow up outpatient for routine nail care  - Podiatry will sign off at this time. Thank you for the consult. HPI: This 59y.o. year old male was seen today for routine nail care. Patient states that due to his weight and \"other health problems\", it has been a while since he has been able to trim his toenails himself. He denies pain to the nails. Admits to numbness, burning, and tingling of bilateral lower extremities. Denies nausea, vomiting, diarrhea, fevers, chills, chest pain, shortness of breath, head ache, or calf pain. No other pedal complaints. Past Medical History:   Diagnosis Date    Adult BMI >=70 kg/sq m [facility])     Essential hypertension, malignant        History reviewed. No pertinent surgical history. No current facility-administered medications on file prior to encounter.      Current Outpatient Medications on File Prior to Encounter   Medication Sig Dispense Refill    Skin Protectants, Misc. (EUCERIN) cream Apply topically as needed for Dry Skin Apply topically as needed.  losartan (COZAAR) 100 MG tablet Take 100 mg by mouth daily      atorvastatin (LIPITOR) 40 MG tablet Take 40 mg by mouth nightly      aspirin 81 MG EC tablet Take 81 mg by mouth daily      metoprolol succinate (TOPROL XL) 25 MG extended release tablet Take 25 mg by mouth daily      fluocinonide (LIDEX) 0.05 % cream Apply topically 2 times daily Apply topically 2 times daily.  hydroCHLOROthiazide (HYDRODIURIL) 25 MG tablet Take 25 mg by mouth daily         No Known Allergies    History reviewed. No pertinent family history. Social History     Socioeconomic History    Marital status: Single     Spouse name: Not on file    Number of children: Not on file    Years of education: Not on file    Highest education level: Not on file   Occupational History    Not on file   Tobacco Use    Smoking status: Never Smoker    Smokeless tobacco: Never Used   Vaping Use    Vaping Use: Never used   Substance and Sexual Activity    Alcohol use: Yes     Comment: occasionally    Drug use: Yes     Types: Marijuana Stacy Inez)     Comment: occasionally    Sexual activity: Not on file   Other Topics Concern    Not on file   Social History Narrative    Not on file     Social Determinants of Health     Financial Resource Strain:     Difficulty of Paying Living Expenses: Not on file   Food Insecurity:     Worried About Running Out of Food in the Last Year: Not on file    Dary of Food in the Last Year: Not on file   Transportation Needs:     Lack of Transportation (Medical): Not on file    Lack of Transportation (Non-Medical):  Not on file   Physical Activity:     Days of Exercise per Week: Not on file    Minutes of Exercise per Session: Not on file   Stress:     Feeling of Stress : Not on file   Social Connections:     Frequency of Communication with Friends and Family: Not on file  Frequency of Social Gatherings with Friends and Family: Not on file    Attends Druze Services: Not on file    Active Member of Clubs or Organizations: Not on file    Attends Club or Organization Meetings: Not on file    Marital Status: Not on file   Intimate Partner Violence:     Fear of Current or Ex-Partner: Not on file    Emotionally Abused: Not on file    Physically Abused: Not on file    Sexually Abused: Not on file   Housing Stability:     Unable to Pay for Housing in the Last Year: Not on file    Number of [place] in the Last Year: Not on file    Unstable Housing in the Last Year: Not on file       Review of Systems  CONSTITUTIONAL: No fevers, chills, diaphoresis  HEENT: No epistaxis, tinnitus  EYES: No diplopia, blurry vision. CARDIOVASCULAR: No chest pain, palpitations, lower extremity edema  PULM: No dyspnea, tachypnea, wheezing  GI: No nausea, vomiting, constipation, diarrhea  : No dysuria, gross hematuria, or pyuria  NEURO: No new balance problems, peripheral weakness, paresthesias, numbness  MSK: No pain  PSY: No concerns regarding depression, anxiety  INTEGUMENTARY: No open lesions    OBJECTIVE:  /78   Pulse 86   Temp 98.2 °F (36.8 °C) (Oral)   Resp 20   Ht 5' 10\" (1.778 m)   Wt (!) 428 lb (194.1 kg)   SpO2 96%   BMI 61.41 kg/m²      Patient is alert and oriented to person, place, and time. Resting in bed comfortably. Pleasant and cooperative. Vascular:   DP and PT pulses are palpable B/L  Capillary Fill time < 5 seconds to B/L digits  Skin temperature warm to warm tibial tuberosity to the digits B/L  Hair growth absent to digits  Mild non-pitting edema  No varicosities     Neurological:   Sensation to light touch intact  Protective sensation deminished    Musculoskeletal/Orthopaedic:   Structural Deformities: None  5/5 muscle strength Dorsiflexion, Plantarflexion, Inversion, Eversion B/L  No TTP    Dermatological:   Skin is mildly xerotic.    Web spaces are clean and dry without debris. No lesions or open wounds appreciated.    Toenails 1-10 are thickened, elongated, and discolored  No HPK      LABS:   Lab Results   Component Value Date    WBC 10.7 07/12/2022    HGB 12.1 (L) 07/12/2022    HCT 36.6 (L) 07/12/2022    MCV 91.5 07/12/2022     07/12/2022     Lab Results   Component Value Date/Time     07/12/2022 04:34 AM    K 3.7 07/12/2022 04:34 AM     07/12/2022 04:34 AM    CO2 21 07/12/2022 04:34 AM    BUN 35 07/12/2022 04:34 AM    CREATININE 1.68 07/12/2022 04:34 AM    GLUCOSE 123 07/12/2022 04:34 AM    CALCIUM 8.9 07/12/2022 04:34 AM      Lab Results   Component Value Date    LABALBU 3.7 07/12/2022     No results found for: SEDRATE  No results found for: CRP  No results found for: LABA1C      MICROBIOLOGY:   N/A      IMAGING:   N/A        Matthias Rodarte DPM, PGY-2  Podiatric Surgery Resident  Podiatry On Call Pager: [phone]  July 13, 2022  4:35 PM

## 2022-07-14 LAB
ALBUMIN SERPL-MCNC: 3.6 G/DL (ref 3.5–4.6)
ALP BLD-CCNC: 91 U/L (ref 35–104)
ALT SERPL-CCNC: 19 U/L (ref 0–41)
AST SERPL-CCNC: 21 U/L (ref 0–40)
BILIRUB SERPL-MCNC: 0.5 MG/DL (ref 0.2–0.7)
BILIRUBIN DIRECT: <0.2 MG/DL (ref 0–0.4)
BILIRUBIN, INDIRECT: NORMAL MG/DL (ref 0–0.6)
TOTAL PROTEIN: 6.8 G/DL (ref 6.3–8)

## 2022-07-14 PROCEDURE — 99212 OFFICE O/P EST SF 10 MIN: CPT

## 2022-07-14 PROCEDURE — 6370000000 HC RX 637 (ALT 250 FOR IP): Performed by: STUDENT IN AN ORGANIZED HEALTH CARE EDUCATION/TRAINING PROGRAM

## 2022-07-14 PROCEDURE — 99232 SBSQ HOSP IP/OBS MODERATE 35: CPT | Performed by: STUDENT IN AN ORGANIZED HEALTH CARE EDUCATION/TRAINING PROGRAM

## 2022-07-14 PROCEDURE — 80076 HEPATIC FUNCTION PANEL: CPT

## 2022-07-14 PROCEDURE — 6370000000 HC RX 637 (ALT 250 FOR IP): Performed by: INTERNAL MEDICINE

## 2022-07-14 PROCEDURE — 1240000000 HC EMOTIONAL WELLNESS R&B

## 2022-07-14 PROCEDURE — 36415 COLL VENOUS BLD VENIPUNCTURE: CPT

## 2022-07-14 PROCEDURE — 2500000003 HC RX 250 WO HCPCS: Performed by: NURSE PRACTITIONER

## 2022-07-14 RX ORDER — RISPERIDONE 2 MG/1
2 TABLET, ORALLY DISINTEGRATING ORAL 2 TIMES DAILY
Status: DISCONTINUED | OUTPATIENT
Start: 2022-07-14 | End: 2022-07-18

## 2022-07-14 RX ADMIN — MICONAZOLE NITRATE: 2 POWDER TOPICAL at 14:04

## 2022-07-14 RX ADMIN — MICONAZOLE NITRATE: 2 POWDER TOPICAL at 21:11

## 2022-07-14 RX ADMIN — FLUOCINONIDE: 0.5 CREAM TOPICAL at 21:04

## 2022-07-14 RX ADMIN — RISPERIDONE 2 MG: 2 TABLET, ORALLY DISINTEGRATING ORAL at 21:04

## 2022-07-14 RX ADMIN — LOSARTAN POTASSIUM 100 MG: 50 TABLET, FILM COATED ORAL at 10:15

## 2022-07-14 RX ADMIN — ACETAMINOPHEN 650 MG: 325 TABLET ORAL at 10:15

## 2022-07-14 RX ADMIN — FLUOCINONIDE: 0.5 CREAM TOPICAL at 12:04

## 2022-07-14 RX ADMIN — ATORVASTATIN CALCIUM 40 MG: 40 TABLET, FILM COATED ORAL at 21:04

## 2022-07-14 RX ADMIN — Medication: at 12:15

## 2022-07-14 RX ADMIN — RISPERIDONE 2 MG: 2 TABLET, ORALLY DISINTEGRATING ORAL at 10:15

## 2022-07-14 RX ADMIN — ASPIRIN 81 MG: 81 TABLET, COATED ORAL at 10:15

## 2022-07-14 RX ADMIN — METOPROLOL SUCCINATE 25 MG: 25 TABLET, EXTENDED RELEASE ORAL at 10:15

## 2022-07-14 ASSESSMENT — PAIN DESCRIPTION - ORIENTATION: ORIENTATION: RIGHT;LEFT

## 2022-07-14 ASSESSMENT — PAIN SCALES - GENERAL
PAINLEVEL_OUTOF10: 0
PAINLEVEL_OUTOF10: 0

## 2022-07-14 ASSESSMENT — PAIN DESCRIPTION - DESCRIPTORS: DESCRIPTORS: ACHING

## 2022-07-14 ASSESSMENT — PAIN DESCRIPTION - LOCATION: LOCATION: FOOT

## 2022-07-14 NOTE — PROGRESS NOTES
Patient paranoid, asking RN about fire doors, need to escape, inability to reorient, redirect. Patient medicated with Vistaril,  Haldol . Patient sitting in dayroom. Will continue to monitor.

## 2022-07-14 NOTE — PROGRESS NOTES
Sitting with female peer in DR walls stating,'' my moms dead,thats why they want to kill me.  It doesn't matter what race you are.''

## 2022-07-14 NOTE — PROGRESS NOTES
Pt in  stood up yelling,''Get out,get out!'' pt looking out double doors when yelling. Pt appears to be hallucinating. Pt able to sit back in chair,in dayroom. Pt very watchful. Agreeable with encouragement to take bedtime meds with desyrel 50mg po at 2115.

## 2022-07-14 NOTE — PROGRESS NOTES
''Theres a natural gas leak in my room. We all have to get out of here. Im following you.'' refusing to lie in bed,despite reassurance.

## 2022-07-14 NOTE — PROGRESS NOTES
Pt. declined to attend the 0900 community meeting, despite staff encouragement.  Goal - \"Eat well and do some walking\" Electronically signed by Brittaney Miller, [address] on 7/14/2022 at 12:27 PM

## 2022-07-14 NOTE — PROGRESS NOTES
[facility]atiana [facility]. FOLLOW-UP NOTE       7/14/2022     Patient was seen and examined in person, Chart reviewed   Patient's case discussed with staff/team    Chief Complaint:   AH and paranoid ideations     Interim History:     Last night he was hallucinating per nursing note. Today objectively patient is cooperative and less paranoid in the morning. He stated that he is doing better. He did not mention about Aden Gilmore. He states his mood is better. Denies paranoid ideations, suicidal ideations with an intent or plan. He denies homicidal ideations at others. Appetite:   [x] Normal/Unchanged  [] Increased  [] Decreased      Sleep:       [x] Normal/Unchanged  [] Fair       [] Poor              Energy:    [x] Normal/Unchanged  [] Increased  [] Decreased        SI [] Present  [x] Absent    HI  []Present  [x] Absent     Aggression:  [] yes  [x] no    Patient is [x] able  [] unable to CONTRACT FOR SAFETY     PAST MEDICAL/PSYCHIATRIC HISTORY:   Past Medical History:   Diagnosis Date    Adult BMI >=70 kg/sq m (HCC)     Essential hypertension, malignant        FAMILY/SOCIAL HISTORY:  History reviewed. No pertinent family history.   Social History     Socioeconomic History    Marital status: Single     Spouse name: Not on file    Number of children: Not on file    Years of education: Not on file    Highest education level: Not on file   Occupational History    Not on file   Tobacco Use    Smoking status: Never Smoker    Smokeless tobacco: Never Used   Vaping Use    Vaping Use: Never used   Substance and Sexual Activity    Alcohol use: Yes     Comment: occasionally    Drug use: Yes     Types: Marijuana Wicho Whipple)     Comment: occasionally    Sexual activity: Not on file   Other Topics Concern    Not on file   Social History Narrative    Not on file     Social Determinants of Health     Financial Resource Strain:     Difficulty of Paying Living Expenses: Not on file Food Insecurity:     Worried About Running Out of Food in the Last Year: Not on file    Dary of Food in the Last Year: Not on file   Transportation Needs:     Lack of Transportation (Medical): Not on file    Lack of Transportation (Non-Medical): Not on file   Physical Activity:     Days of Exercise per Week: Not on file    Minutes of Exercise per Session: Not on file   Stress:     Feeling of Stress : Not on file   Social Connections:     Frequency of Communication with Friends and Family: Not on file    Frequency of Social Gatherings with Friends and Family: Not on file    Attends Confucianism Services: Not on file    Active Member of [address] [place] [address] [employer] or Organizations: Not on file    Attends Club or Organization Meetings: Not on file    Marital Status: Not on file   Intimate Partner Violence:     Fear of Current or Ex-Partner: Not on file    Emotionally Abused: Not on file    Physically Abused: Not on file    Sexually Abused: Not on file   Housing Stability:     Unable to Pay for Housing in the Last Year: Not on file    Number of [place] in the Last Year: Not on file    Unstable Housing in the Last Year: Not on file           ROS:  [x] All negative/unchanged except if checked.  Explain positive(checked items) below:  [] Constitutional  [] Eyes  [] Ear/Nose/Mouth/Throat  [] Respiratory  [] CV  [] GI  []   [] Musculoskeletal  [] Skin/Breast  [] Neurological  [] Endocrine  [] Heme/Lymph  [] Allergic/Immunologic    Explanation:     MEDICATIONS:    Current Facility-Administered Medications:     risperiDONE (RISPERDAL M-TABS) disintegrating tablet 2 mg, 2 mg, Oral, BID, Lisbet Robledo MD, 2 mg at 07/14/22 1015    miconazole (MICOTIN) 2 % powder, , Topical, BID, DEZ Thompson - NP, Given at 07/14/22 1404    aspirin EC tablet 81 mg, 81 mg, Oral, Daily, Fredi Torres MD, 81 mg at 07/14/22 1015    atorvastatin (LIPITOR) tablet 40 mg, 40 mg, Oral, Nightly, Fredi Torres MD, 40 mg at 07/13/22 1597   fluocinonide (LIDEX) 0.05 % cream, , Topical, BID, Sherine Flores MD, Given at 07/14/22 1204    losartan (COZAAR) tablet 100 mg, 100 mg, Oral, Daily, Sherine Flores MD, 100 mg at 07/14/22 1015    metoprolol succinate (TOPROL XL) extended release tablet 25 mg, 25 mg, Oral, Daily, Sherine Flores MD, 25 mg at 07/14/22 1015    Hydrocerin cream CREA, , Topical, PRN, Sherine Flores MD, Given at 07/14/22 1215    acetaminophen (TYLENOL) tablet 650 mg, 650 mg, Oral, Q4H PRN, Becca Bai MD, 650 mg at 07/14/22 1015    polyethylene glycol (GLYCOLAX) packet 17 g, 17 g, Oral, Daily PRN, Becca Bai MD    traZODone (DESYREL) tablet 50 mg, 50 mg, Oral, Nightly PRN, Becca Bai MD, 50 mg at 07/13/22 2115    aluminum & magnesium hydroxide-simethicone (MAALOX) 200-200-20 MG/5ML suspension 30 mL, 30 mL, Oral, Q6H PRN, Becca Bai MD    benztropine mesylate (COGENTIN) injection 2 mg, 2 mg, IntraMUSCular, BID PRN, Becca Bai MD    hydrOXYzine pamoate (VISTARIL) capsule 50 mg, 50 mg, Oral, Q6H PRN, 50 mg at 07/13/22 2221 **OR** hydrOXYzine (VISTARIL) injection 50 mg, 50 mg, IntraMUSCular, Q6H PRN, Becca Bai MD    haloperidol (HALDOL) tablet 5 mg, 5 mg, Oral, Q6H PRN, 5 mg at 07/13/22 2221 **OR** haloperidol lactate (HALDOL) injection 5 mg, 5 mg, IntraMUSCular, Q6H PRN, Becca Bai MD      Examination:  BP (!) 148/81   Pulse 81   Temp 97.6 °F (36.4 °C)   Resp 16   Ht 5' 10\" (1.778 m)   Wt (!) 428 lb (194.1 kg)   SpO2 96%   BMI 61.41 kg/m²   Gait - wide based   Medication side effects(SE): none    Mental Status Examination:    Level of consciousness:  within normal limits   Appearance:  poor grooming and poor hygiene  Behavior/Motor:  no abnormalities noted  Attitude toward examiner:  cooperative  Speech:  monotone   Mood: \"doing better\"  Affect:  mood congruent  Thought processes:  slow   Thought content:  Suicidal Ideation:  denies suicidal ideation, without plan and without intent  Perceptual Disturbance:  denies any perceptual disturbance  Cognition:  oriented to person, place, and time   Concentration intact  Insight: improving  Judgement poor     ASSESSMENT:   Patient symptoms are:  [] Well controlled  [x] Improving  [] Worsening  [] No change      Diagnosis:   Principal Problem:    Psychosis ([address]  Resolved Problems:    * No resolved hospital problems. *      LABS:    Recent Labs     07/12/22 0434   WBC 10.7   HGB 12.1*        Recent Labs     07/12/22  0434      K 3.7      CO2 21   BUN 35*   CREATININE 1.68*   GLUCOSE 123*     Recent Labs     07/12/22  0434 07/14/22  0915   BILITOT 0.6 0.5   ALKPHOS 103 91   AST 19 21   ALT 20 19     Lab Results   Component Value Date/Time    LABAMPH Neg 07/12/2022 03:00 AM    BARBSCNU Neg 07/12/2022 03:00 AM    LABBENZ Neg 07/12/2022 03:00 AM    LABMETH Neg 07/12/2022 03:00 AM    OPIATESCREENURINE Neg 07/12/2022 03:00 AM    PHENCYCLIDINESCREENURINE Neg 07/12/2022 03:00 AM    ETOH <10 07/12/2022 04:34 AM     Lab Results   Component Value Date/Time    TSH 1.760 07/12/2022 04:34 AM     No results found for: LITHIUM  No results found for: VALPROATE, CBMZ    RISK ASSESSMENT: Denies SI without an intent or plan    Treatment Plan:  Reviewed current Medications with the patient. Increased Risperdal m tabs to 2 mg BID to decreases paranoia and AH  Risks, benefits, side effects, drug-to-drug interactions and alternatives to treatment were discussed. Collateral information:   CD evaluation  Encourage patient to attend group and other milieu activities.   Discharge planning discussed with the patient and treatment team.    PSYCHOTHERAPY/COUNSELING:  [x] Therapeutic interview  [x] Supportive  [] CBT  [] Ongoing  [] Other    [x] Patient continues to need, on a daily basis, active treatment furnished directly by or requiring the supervision of inpatient psychiatric personnel      Anticipated Length of stay: 3-7 days         Electronically signed by Darrell Hi MD on 7/14/2022 at 3:52 PM

## 2022-07-14 NOTE — PROGRESS NOTES
Pt. refused to attend the 1000 skills group, despite staff encouragement. Electronically signed by Iron Sr, [address] on 7/14/2022 at 12:28 PM

## 2022-07-14 NOTE — PROGRESS NOTES
Pt out on unit, but not social with peers, short time periods. Pt avoids, paranoid, impaired concentration, unexplained fears. Pt requires reality orientation. Pt reports showering today. Pt reports good appetite Pt reports good sleep. Pt denies anxiety, depression, required education, before denying both. Pt denies attending groups, inability to elaborate. Pt denies SI, HI and A/V hallucinations. Pt observed talking aloud, no one present. Will continue to monitor.

## 2022-07-14 NOTE — PROGRESS NOTES
Pt asking several different staff for eczema cream. Pt has been told several times by his nurse Murphy Cornell LPN, she will give it with scheduled meds.  Poor short term memory noted

## 2022-07-14 NOTE — GROUP NOTE
Group Therapy Note    Date: 7/13/2022    Group Start Time: 4117  Group End Time: 1800  Group Topic: Healthy Living/Wellness    [facility]    [place]        Group Therapy Note    Attendees: 8/16         Patient's Goal:  To learn how coping skills affect mood. Notes:  Patient participated in [address] group.     Status After Intervention:  Unchanged    Participation Level: Minimal    Participation Quality: Appropriate and Attentive      Speech:  hesitant      Thought Process/Content: Linear      Affective Functioning: Flat      Mood: euthymic      Level of consciousness:  Alert and Attentive      Response to Learning: Progressing to goal      Endings: None Reported    Modes of Intervention: Education      Discipline Responsible: [address], [facility]      Signature:  [facility]

## 2022-07-14 NOTE — PROGRESS NOTES
Pt awoken from nap, ordered powder placed under abd and breast folds, they are deeply redden. Pt was encouraged to group.

## 2022-07-14 NOTE — PROGRESS NOTES
Wound Ostomy Continence Nurse  Consult Note       NAME:  Alicia RECORD NUMBER:  [id number]  AGE: 59 y.o. GENDER: male  : 1960  TODAY'S DATE:  2022    Subjective   Reason for [address] Nurse Evaluation and Assessment: Fungal rash in skin folds      Alec Lee is a 59 y.o. male referred by:   [x] Physician  [] Nursing  [] Other:     Wound Identification:  Wound Type: fungal rash  Contributing Factors: obesity and poor hygiene    Wound History: Patient admitted to [facility] with fungal rash to the abdominal and breast skin folds. Patient also has history of psoriasis, which topical medication was already ordered. Current Wound Care Treatment: Recommending topical antifungal powder to skins folds BID, brush out the excess powder    Patient Goal of Care:  [] Wound Healing  [] Odor Control  [] Palliative Care  [] Pain Control   [x] Other: moisture management to skin folds        PAST MEDICAL HISTORY        Diagnosis Date    Adult BMI >=70 kg/sq m (HCC)     Essential hypertension, malignant        PAST SURGICAL HISTORY    History reviewed. No pertinent surgical history. FAMILY HISTORY    History reviewed. No pertinent family history. SOCIAL HISTORY    Social History     Tobacco Use    Smoking status: Never Smoker    Smokeless tobacco: Never Used   Vaping Use    Vaping Use: Never used   Substance Use Topics    Alcohol use: Yes     Comment: occasionally    Drug use: Yes     Types: Marijuana Valdene Tobar)     Comment: occasionally       ALLERGIES    No Known Allergies    MEDICATIONS    No current facility-administered medications on file prior to encounter. Current Outpatient Medications on File Prior to Encounter   Medication Sig Dispense Refill    Skin Protectants, Misc. (EUCERIN) cream Apply topically as needed for Dry Skin Apply topically as needed.       losartan (COZAAR) 100 MG tablet Take 100 mg by mouth daily      atorvastatin (LIPITOR) 40 MG tablet Take 40 mg by mouth nightly      aspirin 81 MG EC tablet Take 81 mg by mouth daily      metoprolol succinate (TOPROL XL) 25 MG extended release tablet Take 25 mg by mouth daily      fluocinonide (LIDEX) 0.05 % cream Apply topically 2 times daily Apply topically 2 times daily.  hydroCHLOROthiazide (HYDRODIURIL) 25 MG tablet Take 25 mg by mouth daily         Objective    BP (!) 154/89   Pulse 98   Temp 98.9 °F (37.2 °C) (Oral)   Resp 17   Ht 5' 10\" (1.778 m)   Wt (!) 428 lb (194.1 kg)   SpO2 98%   BMI 61.41 kg/m²     LABS:  WBC:    Lab Results   Component Value Date/Time    WBC 10.7 07/12/2022 04:34 AM     H/H:    Lab Results   Component Value Date/Time    HGB 12.1 07/12/2022 04:34 AM    HCT 36.6 07/12/2022 04:34 AM     PTT:  No results found for: APTT, PTT[APTT}  PT/INR:  No results found for: PROTIME, INR  HgBA1c:  No results found for: LABA1C    Assessment   Shayan Risk Score: Shayan Scale Score: 17    Patient Active Problem List   Diagnosis    Psychosis ([facility] [address]       Assessment:    Patient has significant fungal rash to the skin folds of the abdomen and under breasts. Skin is red, moist, slightly denuded, with satellite lesions and odor noted. Plan   Plan of Care:   Topical antifungal powder BID and PRN    Specialty Bed Required : Yes   [] Low Air Loss   [] Pressure Redistribution  [] Fluid Immersion  [x] Bariatric  [] Other:     Current Diet: ADULT DIET;  Regular  Dietician consult:  N/A    Discharge Plan:  Placement for patient upon discharge: home with support    Patient appropriate for Outpatient [address]: N/A    Referrals:  []   [] [address] [employer] [facility]  [] Supplies  [] Other    Patient/Caregiver Teaching:  Level of patient/caregiver understanding able to:   [] Indicates understanding       [] Needs reinforcement  [] Unsuccessful      [] Verbal Understanding  [] Demonstrated understanding       [] No evidence of learning  [] Refused teaching         [x] N/A       Electronically signed by KOKO LoraN, RN, CWOCN on 7/14/2022 at 12:41 PM

## 2022-07-14 NOTE — GROUP NOTE
Group Therapy Note    Date: 7/13/2022    Group Start Time: 1950  Group End Time: 2020  Group Topic: Recreational    [facility] ZHANE Lerner        Group Therapy Note    Attendees: 8/19         Patient's Goal:  To play Wii Loglyling with peers. Notes:  Patient played the game with the group. Patient left group early.     Status After Intervention:  Unchanged    Participation Level: Minimal    Participation Quality: Appropriate and Attentive      Speech:  pressured      Thought Process/Content: Linear      Affective Functioning: Flat      Mood: irritable      Level of consciousness:  Alert and Attentive      Response to Learning: Progressing to goal      Endings: None Reported    Modes of Intervention: Activity      Discipline Responsible: [address], [employer]      Signature:  Aristeo Lerner

## 2022-07-14 NOTE — PROGRESS NOTES
Pt is noted up on the unit , calmer today,less rapid speech, with racing thoughts noted, explained and gave all am meds, pt was agreeable this  Am to a Bp this am as well, pt agreeable to allowing oint and cream to be applied as ordered to many scattered areas such as back of the neck, arms legs buttocks lower back, legs . ET nurse here to see pt and ordered a powder for under the arms.  Pt was noted sitting with male peer at lunch until a comment was made by this male peer hearing talk about leprocy in the bible and pt left that table and sat with another peer, pt made several comments yesterday about his weight , one to foot doctor and general ones to staff , about his weight, pt appears self consious of weight,

## 2022-07-14 NOTE — PROGRESS NOTES
Patient resting in bed with eyes closed. Patient easily arousable for interview and assessment. He is A/O X4. Denies SI, HI, AVH. Denies needs at this time. Needs a lot of redirection. Is guarded. Patient is not wanting to talk at this time, but prefers to sleep as he denies getting good sleep last night. He reports good appetite. Will continue to monitor.

## 2022-07-14 NOTE — GROUP NOTE
Group Therapy Note    Date: 7/14/2022    Group Start Time: 1100  Group End Time: 1140  Group Topic: Psychotherapy    [facility]    Vikki Judge, [facility]        Group Therapy Note    Attendees: 9         Patient's Goal:  To discuss coping skills and support system     Notes:  Patient was inappropriate asking this writer personal questions and stating , \" you are very attractive\"     Status After Intervention:  Unchanged    Participation Level: Monopolizing    Participation Quality: Inappropriate      Speech:  pressured      Thought Process/Content: Flight of ideas      Affective Functioning: Exaggerated      Mood: elevated      Level of consciousness:  Attentive      Response to Learning: Progressing to goal      Endings: None Reported    Modes of Intervention: Support      Discipline Responsible: /Counselor      Signature:  Vikki Judge, [facility]

## 2022-07-14 NOTE — GROUP NOTE
Group Therapy Note    Date: 7/14/2022    Group Start Time: 5039  Group End Time: 1050  Group Topic: [address], RN        Group Therapy Note    Attendees: 8/13         Patient's Goal:  To get better    Notes:      Status After Intervention:  Improved    Participation Level:  Active Listener    Participation Quality: Appropriate      Speech:  normal      Thought Process/Content: Logical      Affective Functioning: Congruent      Mood: angry      Level of consciousness:  Alert and Oriented x4      Response to Learning: Able to verbalize current knowledge/experience      Endings: None Reported    Modes of Intervention: Support      Discipline Responsible: Registered Nurse      Signature:  Josue Gillespie RN

## 2022-07-14 NOTE — PROGRESS NOTES
Behavioral Services  Medicare Certification Upon Admission    I certify that this patient's inpatient psychiatric hospital admission is medically necessary for:    [x] (1) Treatment which could reasonably be expected to improve this patient's condition,       [x] (2) Or for diagnostic study;     AND     [x](2) The inpatient psychiatric services are provided while the individual is under the care of a physician and are included in the individualized plan of care.     Estimated length of stay/service: 7-10 days    Plan for post-hospital care Outpatient     Electronically signed by Kelly Cueva MD on 7/14/2022 at 3:55 PM

## 2022-07-14 NOTE — PROGRESS NOTES
Pt wanting to sleep in 384,redirected pt away from room,explaining another pt in the room. Walked with pt to his room,and encouraged to lie down. Informed pt staff will be checking on him every 15min. to keep him safe.

## 2022-07-15 PROCEDURE — 1240000000 HC EMOTIONAL WELLNESS R&B

## 2022-07-15 PROCEDURE — 6370000000 HC RX 637 (ALT 250 FOR IP): Performed by: STUDENT IN AN ORGANIZED HEALTH CARE EDUCATION/TRAINING PROGRAM

## 2022-07-15 PROCEDURE — 99232 SBSQ HOSP IP/OBS MODERATE 35: CPT | Performed by: STUDENT IN AN ORGANIZED HEALTH CARE EDUCATION/TRAINING PROGRAM

## 2022-07-15 PROCEDURE — 6370000000 HC RX 637 (ALT 250 FOR IP): Performed by: INTERNAL MEDICINE

## 2022-07-15 RX ADMIN — ASPIRIN 81 MG: 81 TABLET, COATED ORAL at 08:51

## 2022-07-15 RX ADMIN — METOPROLOL SUCCINATE 25 MG: 25 TABLET, EXTENDED RELEASE ORAL at 08:51

## 2022-07-15 RX ADMIN — RISPERIDONE 2 MG: 2 TABLET, ORALLY DISINTEGRATING ORAL at 21:24

## 2022-07-15 RX ADMIN — Medication: at 10:27

## 2022-07-15 RX ADMIN — RISPERIDONE 2 MG: 2 TABLET, ORALLY DISINTEGRATING ORAL at 08:51

## 2022-07-15 RX ADMIN — LOSARTAN POTASSIUM 100 MG: 50 TABLET, FILM COATED ORAL at 08:51

## 2022-07-15 RX ADMIN — MICONAZOLE NITRATE: 2 POWDER TOPICAL at 10:27

## 2022-07-15 RX ADMIN — ATORVASTATIN CALCIUM 40 MG: 40 TABLET, FILM COATED ORAL at 21:24

## 2022-07-15 RX ADMIN — FLUOCINONIDE: 0.5 CREAM TOPICAL at 10:27

## 2022-07-15 NOTE — PROGRESS NOTES
[facility]atiana [facility]. FOLLOW-UP NOTE       7/15/2022     Patient was seen and examined in person, Chart reviewed   Patient's case discussed with staff/team    Chief Complaint: Paranoid ideations     Interim History:     Patient is more cooperative and calm compared to the previous 2 days. He appears less paranoid objectively. He denies auditory hallucinations last night and today. He stated that he feels better with Risperdal. He denies SI with an intent or plan. Appetite:  [x] Normal/Unchanged  [] Increased  [] Decreased      Sleep:       [x] Normal/Unchanged  [] Fair       [] Poor              Energy:    [x] Normal/Unchanged  [] Increased  [] Decreased        SI [] Present  [x] Absent    HI  []Present  [x] Absent     Aggression:  [] yes  [x] no    Patient is [x] able  [] unable to CONTRACT FOR SAFETY     PAST MEDICAL/PSYCHIATRIC HISTORY:   Past Medical History:   Diagnosis Date    Adult BMI >=70 kg/sq m ([facility] [address]     Essential hypertension, malignant        FAMILY/SOCIAL HISTORY:  History reviewed. No pertinent family history.   Social History     Socioeconomic History    Marital status: Single     Spouse name: Not on file    Number of children: Not on file    Years of education: Not on file    Highest education level: Not on file   Occupational History    Not on file   Tobacco Use    Smoking status: Never    Smokeless tobacco: Never   Vaping Use    Vaping Use: Never used   Substance and Sexual Activity    Alcohol use: Yes     Comment: occasionally    Drug use: Yes     Types: Marijuana Darryle Pimple)     Comment: occasionally    Sexual activity: Not on file   Other Topics Concern    Not on file   Social History Narrative    Not on file     Social Determinants of Health     Financial Resource Strain: Not on file   Food Insecurity: Not on file   Transportation Needs: Not on file   Physical Activity: Not on file   Stress: Not on file   Social Connections: Not on file   Intimate Partner Violence: Not on file   Housing Stability: Not on file           ROS:  [x] All negative/unchanged except if checked.  Explain positive(checked items) below:  [] Constitutional  [] Eyes  [] Ear/Nose/Mouth/Throat  [] Respiratory  [] CV  [] GI  []   [] Musculoskeletal  [] Skin/Breast  [] Neurological  [] Endocrine  [] Heme/Lymph  [] Allergic/Immunologic    Explanation:     MEDICATIONS:    Current Facility-Administered Medications:     risperiDONE (RISPERDAL M-TABS) disintegrating tablet 2 mg, 2 mg, Oral, BID, Chandra Lea MD, 2 mg at 07/15/22 0851    miconazole (MICOTIN) 2 % powder, , Topical, BID, DEZ Rodarte NP, Given at 07/15/22 1027    aspirin EC tablet 81 mg, 81 mg, Oral, Daily, Inge White MD, 81 mg at 07/15/22 0851    atorvastatin (LIPITOR) tablet 40 mg, 40 mg, Oral, Nightly, Inge White MD, 40 mg at 07/14/22 2104    fluocinonide (LIDEX) 0.05 % cream, , Topical, BID, Inge White MD, Given at 07/15/22 1027    losartan (COZAAR) tablet 100 mg, 100 mg, Oral, Daily, Inge White MD, 100 mg at 07/15/22 0851    metoprolol succinate (TOPROL XL) extended release tablet 25 mg, 25 mg, Oral, Daily, Inge White MD, 25 mg at 07/15/22 0851    Hydrocerin cream CREA, , Topical, PRN, Inge White MD, Given at 07/15/22 1027    acetaminophen (TYLENOL) tablet 650 mg, 650 mg, Oral, Q4H PRN, Chandra Lea MD, 650 mg at 07/14/22 1015    polyethylene glycol (GLYCOLAX) packet 17 g, 17 g, Oral, Daily PRN, Chandra Lea MD    traZODone (DESYREL) tablet 50 mg, 50 mg, Oral, Nightly PRN, Chandra Lea MD, 50 mg at 07/13/22 2115    aluminum & magnesium hydroxide-simethicone (MAALOX) 200-200-20 MG/5ML suspension 30 mL, 30 mL, Oral, Q6H PRN, Chandra Lea MD    benztropine mesylate (COGENTIN) injection 2 mg, 2 mg, IntraMUSCular, BID PRN, Chandra Lea MD    hydrOXYzine pamoate (VISTARIL) capsule 50 mg, 50 mg, Oral, Q6H PRN, 50 mg at 07/13/22 2221 **OR** hydrOXYzine (VISTARIL) injection 50 mg, 50 mg, IntraMUSCular, Q6H PRN, Chandra Lea MD haloperidol (HALDOL) tablet 5 mg, 5 mg, Oral, Q6H PRN, 5 mg at 07/13/22 2221 **OR** haloperidol lactate (HALDOL) injection 5 mg, 5 mg, IntraMUSCular, Q6H PRN, Becca Bia MD      Examination:  BP (!) 146/72   Pulse 81   Temp 97.7 °F (36.5 °C)   Resp 18   Ht 5' 10\" (1.778 m)   Wt (!) 428 lb (194.1 kg)   SpO2 99%   BMI 61.41 kg/m²   Gait - wide based  Medication side effects(SE): None    Mental Status Examination:    Level of consciousness:  within normal limits   Appearance:  poor grooming and poor hygiene  Behavior/Motor:  no abnormalities noted  Attitude toward examiner:  cooperative  Speech:  slow   Mood: still sad but stated he is improving   Affect:  mood congruent  Thought processes:  circumstanial    Thought content:  Preoccupied with jason rosa Denies AVH  Cognition:  oriented to person, place, and time   Concentration intact  Insight poor   Judgement poor     ASSESSMENT:   Patient symptoms are:  [] Well controlled  [x] Improving  [] Worsening  [] No change      Diagnosis:   [facility]    [facility]  Resolved Problems:    * No resolved hospital problems. *      LABS:    No results for input(s): WBC, HGB, PLT in the last 72 hours. No results for input(s): NA, K, CL, CO2, BUN, CREATININE, GLUCOSE in the last 72 hours. Recent Labs     07/14/22  0915   BILITOT 0.5   ALKPHOS 91   AST 21   ALT 19     Lab Results   Component Value Date/Time    LABAMPH Neg 07/12/2022 03:00 AM    BARBSCNU Neg 07/12/2022 03:00 AM    LABBENZ Neg 07/12/2022 03:00 AM    LABMETH Neg 07/12/2022 03:00 AM    OPIATESCREENURINE Neg 07/12/2022 03:00 AM    PHENCYCLIDINESCREENURINE Neg 07/12/2022 03:00 AM    ETOH <10 07/12/2022 04:34 AM     Lab Results   Component Value Date/Time    TSH 1.760 07/12/2022 04:34 AM     No results found for: LITHIUM  No results found for: VALPROATE, CBMZ    RISK ASSESSMENT: None    Treatment Plan:  Reviewed current Medications with the patient.    Continue Risperdal M-tabs 2 mg BID for paranoia and psychosis  Risks, benefits, side effects, drug-to-drug interactions and alternatives to treatment were discussed. Collateral information:   CD evaluation  Encourage patient to attend group and other milieu activities.   Discharge planning discussed with the patient and treatment team.    PSYCHOTHERAPY/COUNSELING:  [x] Therapeutic interview  [x] Supportive  [] CBT  [] Ongoing  [] Other    [x] Patient continues to need, on a daily basis, active treatment furnished directly by or requiring the supervision of inpatient psychiatric personnel      Anticipated Length of stay:4-7 days          Electronically signed by Imelda Cooper MD on 7/15/2022 at 4:27 PM

## 2022-07-15 NOTE — GROUP NOTE
Group Therapy Note    Date: 7/15/2022    Group Start Time: 1108  Group End Time: 1140  Group Topic: Psychotherapy    MLISAAC 3W REBECCAI    Nicole Kiran, [facility]        Group Therapy Note    Attendees: 10       Patient's Goal:  learn thinking mistakes    Notes:  patient participated    Status After Intervention:  Improved    Participation Level: Interactive    Participation Quality: Attentive      Speech:  normal      Thought Process/Content: Logical      Affective Functioning: Congruent      Mood: anxious      Level of consciousness:  Alert      Response to Learning: Progressing to goal      Endings: None Reported    Modes of Intervention: Support      Discipline Responsible: /Counselor      Signature:  Tricia Turpin, [facility]

## 2022-07-15 NOTE — PROGRESS NOTES
Ct in DR, social with another ct. Smiling and bright and states, \"I am here rigoberto they say I am crazy\" but \"I am nervous\". Sentences are disconnected and answers to questions incomplete, laughing and smiling upon interview.

## 2022-07-15 NOTE — CARE COORDINATION
FAMILY COLLATERAL NOTE    Family/Support Name:  carolina   Contact #: [phone]  Relationship to Pt[de-identified] dad         Family/Support contact aware of hospitalization: yes     Top 3 Life Stressors:   Hallucinations   Thinks that people are after him  Tells me things are going to happen on a certain day. Background History Relevant to Current Hospitalization: \" I don't rattle his cage a lot so he does okay but if I do challenge him he struggles a lot. \"  Reports symptoms have been going on for awhile. Reports he has been going out and knocking on peoples doors, going into [employer] cars. First time he went to hospital and was released. Second time it was reported. Third time he was admitted currently. Reports pt has been living by himself. The past couple years he has advanced to the point he is going to be shot  by going to [employer] randomly. Reports no symptoms when patient was in 20-30s. Reports it started maybe 3 years ago. Reports its been there and gradually gotten worse. Reports he is fearful for himself and for anyone else if they get in his way due to his symptoms. 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 Pt resides alone and but is only a couple blocks away from dad. Reports pt was able to keep jobs for many years. Reports if he gets charges, that something might happen to him. Pt smokes marijuana daily. Reports this may be a problem and maybe he got \"some bad stuff. \"     Family Mental Health/Substance Use History:   Paternal grandma- delusional starting around 36 or so. Denies any other family history. Support Network's Goal for Hospitalization: for him to get the help he needs     Discharge Plan: discharge home to dads and link with outpatient services.       Support Network Supportive of Discharge Plan:  in agreement with whatever MD and staff recommends. Support can confirm Safety of Location and Security of Weapons: no firearms       Support agreeable to Safeguard and Monitor Medications (including Prescription and OTC): willing to monitor medications and safeguard them.      Identified Barriers to Compliance with Discharge Plan: lack of insight     Recommendations for Support Network:  be available for follow up calls         CARLOS ToddS

## 2022-07-15 NOTE — GROUP NOTE
Group Therapy Note    Date: 7/14/2022    Group Start Time: 1930  Group End Time: 2000  Group Topic: Recreational    MLOZ 3W HERMELINDO Bhakta        Group Therapy Note    Attendees: 12/15         Patient's Goal:  To play corn hole with the group or socialize. Notes:  Patient participated in Activity group.      Status After Intervention:  Unchanged    Participation Level: Interactive    Participation Quality: Appropriate and Attentive      Speech:  pressured      Thought Process/Content: Linear      Affective Functioning: Congruent      Mood: euthymic      Level of consciousness:  Alert and Attentive      Response to Learning: Progressing to goal      Endings: None Reported    Modes of Intervention: Activity      Discipline Responsible: [employer]      Signature:  Hany Bhakta

## 2022-07-15 NOTE — GROUP NOTE
Group Therapy Note    Date: 7/14/2022    Group Start Time: 2000  Group End Time: 2010  Group Topic: Wrap-Up    [facility]    Leonardo Yip        Group Therapy Note    Attendees: 12/15         Patient's Goal:  \"to participate in group therapies and stuff\"    Notes:  Patient reported meeting their goal for the day.      Status After Intervention:  Unchanged    Participation Level: Interactive    Participation Quality: Appropriate, Attentive and Sharing      Speech:  normal      Thought Process/Content: Linear      Affective Functioning: Congruent      Mood: euthymic      Level of consciousness:  Alert and Attentive      Response to Learning: Progressing to goal      Endings: None Reported    Modes of Intervention: Support      Discipline Responsible: For Your Imagination      Signature:  Leonardo Yip

## 2022-07-15 NOTE — PROGRESS NOTES
Pt visible on unit, is minimally social with select peers. On assess pt calm and cooperative. Pt reports feeling better overall and appears less paranoid. Pt thoughts organized during the assessment, processing remains slow. Pt denies any anxiety, depression, SI, HI, or AVH. Pt denies any concerns with his sleep or appetite. Pt continues with poor management of his ADL's. Encouraged pt to shower and brush teeth. Pt encouraged to attend groups.

## 2022-07-15 NOTE — GROUP NOTE
Group Therapy Note    Date: 7/14/2022    Group Start Time: 1900  Group End Time: 1930  Group Topic: Healthy Living/Wellness    [facility]    Cathi Drivers        Group Therapy Note    Attendees: 12/15         Patient's Goal:  To learn about and practice healthy coping skills. Notes:  Patient participated in [address] group.      Status After Intervention:  Unchanged    Participation Level: Interactive    Participation Quality: Appropriate and Attentive      Speech:  pressured      Thought Process/Content: Flight of ideas      Affective Functioning: Flat      Mood: euthymic      Level of consciousness:  Alert and Attentive      Response to Learning: Progressing to goal      Endings: None Reported    Modes of Intervention: Education      Discipline Responsible: [address] [employer] [employer]      Signature:  Cathi Drivers

## 2022-07-16 PROCEDURE — 1240000000 HC EMOTIONAL WELLNESS R&B

## 2022-07-16 PROCEDURE — 6370000000 HC RX 637 (ALT 250 FOR IP): Performed by: STUDENT IN AN ORGANIZED HEALTH CARE EDUCATION/TRAINING PROGRAM

## 2022-07-16 PROCEDURE — 6370000000 HC RX 637 (ALT 250 FOR IP): Performed by: INTERNAL MEDICINE

## 2022-07-16 RX ADMIN — ASPIRIN 81 MG: 81 TABLET, COATED ORAL at 10:03

## 2022-07-16 RX ADMIN — ATORVASTATIN CALCIUM 40 MG: 40 TABLET, FILM COATED ORAL at 21:06

## 2022-07-16 RX ADMIN — Medication: at 11:25

## 2022-07-16 RX ADMIN — METOPROLOL SUCCINATE 25 MG: 25 TABLET, EXTENDED RELEASE ORAL at 10:03

## 2022-07-16 RX ADMIN — FLUOCINONIDE: 0.5 CREAM TOPICAL at 11:24

## 2022-07-16 RX ADMIN — MICONAZOLE NITRATE: 2 POWDER TOPICAL at 11:26

## 2022-07-16 RX ADMIN — ACETAMINOPHEN 650 MG: 325 TABLET ORAL at 10:02

## 2022-07-16 RX ADMIN — LOSARTAN POTASSIUM 100 MG: 50 TABLET, FILM COATED ORAL at 10:03

## 2022-07-16 RX ADMIN — RISPERIDONE 2 MG: 2 TABLET, ORALLY DISINTEGRATING ORAL at 10:03

## 2022-07-16 RX ADMIN — RISPERIDONE 2 MG: 2 TABLET, ORALLY DISINTEGRATING ORAL at 21:06

## 2022-07-16 ASSESSMENT — PAIN DESCRIPTION - LOCATION: LOCATION: BACK;FOOT

## 2022-07-16 ASSESSMENT — PAIN DESCRIPTION - DESCRIPTORS: DESCRIPTORS: ACHING

## 2022-07-16 ASSESSMENT — PAIN DESCRIPTION - ORIENTATION: ORIENTATION: RIGHT;LEFT

## 2022-07-16 ASSESSMENT — PAIN SCALES - GENERAL: PAINLEVEL_OUTOF10: 0

## 2022-07-16 NOTE — GROUP NOTE
Group Therapy Note    Date: 7/16/2022    Group Start Time: 1630  Group End Time: 1700  Group Topic: Healthy Living/Wellness    MLOZ 3W I    Jesse South        Group Therapy Note    Attendees: 7       Patient's Goal:  To learn about and discuss healthy boundarie    Notes:  Pt participated in group discussion    Status After Intervention:  Improved    Participation Level:  Active Listener and Interactive    Participation Quality: Appropriate and Attentive      Speech:  normal      Thought Process/Content: Logical      Affective Functioning: Congruent      Mood: euthymic      Level of consciousness:  Alert and Oriented x4      Response to Learning: Able to verbalize current knowledge/experience      Endings: None Reported    Modes of Intervention: Education and Socialization      Discipline Responsible: Behavorial Health Tech      Signature:  Jesse Gonzalez

## 2022-07-16 NOTE — GROUP NOTE
Group Therapy Note    Date: 7/15/2022    Group Start Time: 2100  Group End Time: 2115  Group Topic: Wrap-Up    [facility]    Rafael Fonseca RN        Group Therapy Note    Attendees: 6       Patient's Goal:  To shave     Notes:  Pt stated he would shave tomorrow    Status After Intervention:  Unchanged    Participation Level:  Active Listener    Participation Quality: Appropriate      Speech:  normal      Thought Process/Content: Logical      Affective Functioning: Congruent      Mood: euthymic      Level of consciousness:  Alert and Oriented x4      Response to Learning: Progressing to goal      Endings: None Reported    Modes of Intervention: Support      Discipline Responsible: Registered Nurse      Signature:  Rafael Fonseca RN

## 2022-07-16 NOTE — PROGRESS NOTES
Explained and gave all am meds, pt is pleasant this morning, briefly nodded head when asked if depressed or anxious, no numbers given. pt reports he showered, gave tylenol for feet and back pain, no number given.  Pt ate a good breakfast,

## 2022-07-16 NOTE — GROUP NOTE
Group Therapy Note    Date: 7/15/2022    Group Start Time: 0744  Group End Time: 6999  Group Topic: Healthy Living/Wellness    MLOZ 3W I    Katherine Bermudez RN        Group Therapy Note    Attendees: 6       Patient's Goal:  Attend group    Notes: Attentive    Status After Intervention:  Unchanged    Participation Level:  Active Listener    Participation Quality: Appropriate      Speech:  normal      Thought Process/Content: Logical      Affective Functioning: Congruent      Mood: euthymic      Level of consciousness:  Alert      Response to Learning: Progressing to goal      Endings: None Reported    Modes of Intervention: Support      Discipline Responsible: Registered Nurse      Signature:  Katherine Bermudez RN

## 2022-07-16 NOTE — GROUP NOTE
Group Therapy Note    Date: 7/16/2022    Group Start Time: 1000  Group End Time: 1100  Group Topic: Psychoeducation    [facility]    Loren Rodney, CTRS        Group Therapy Note    Attendees: 7       Patient's Goal:  \"to shave\"    Notes:  Pt. attended the 1000 skill group. Pt. showered today and feels better. Some unusual comments. Status After Intervention:  Improved    Participation Level:  Active Listener and Interactive    Participation Quality: Appropriate, Attentive, Sharing, and Inappropriate      Speech:  normal      Thought Process/Content: Delusional      Affective Functioning: Congruent      Mood:  calm      Level of consciousness:  Alert, Oriented x4, Attentive, and Preoccupied      Response to Learning: Progressing to goal      Endings: None Reported    Modes of Intervention: Education, Support, Socialization, and Activity      Discipline Responsible: Psychoeducational Specialist      Signature:  Ector Mcgraw

## 2022-07-16 NOTE — PROGRESS NOTES
Tatiana [facility]. FOLLOW-UP NOTE       7/16/2022     Patient was seen and examined in person, Chart reviewed   Patient's case discussed with staff/team    Chief Complaint: Paranoid ideations     Interim History:     Patient said he was feeling \"OK\". He said his sleep and appetite were \"good\". He denied having suicidal or homicidal ideation. He said his mood was \"OK\". He denied having hallucinations. He said his paranoia is \"getting better\". Appetite:  [x] Normal/Unchanged  [] Increased  [] Decreased      Sleep:       [x] Normal/Unchanged  [] Fair       [] Poor              Energy:    [x] Normal/Unchanged  [] Increased  [] Decreased        SI [] Present  [x] Absent    HI  []Present  [x] Absent     Aggression:  [] yes  [x] no        PAST MEDICAL/PSYCHIATRIC HISTORY:   Past Medical History:   Diagnosis Date    Adult BMI >=70 kg/sq m (HCC)     Essential hypertension, malignant        FAMILY/SOCIAL HISTORY:  History reviewed. No pertinent family history.   Social History     Socioeconomic History    Marital status: Single     Spouse name: Not on file    Number of children: Not on file    Years of education: Not on file    Highest education level: Not on file   Occupational History    Not on file   Tobacco Use    Smoking status: Never    Smokeless tobacco: Never   Vaping Use    Vaping Use: Never used   Substance and Sexual Activity    Alcohol use: Yes     Comment: occasionally    Drug use: Yes     Types: Marijuana Berneta Bg)     Comment: occasionally    Sexual activity: Not on file   Other Topics Concern    Not on file   Social History Narrative    Not on file     Social Determinants of Health     Financial Resource Strain: Not on file   Food Insecurity: Not on file   Transportation Needs: Not on file   Physical Activity: Not on file   Stress: Not on file   Social Connections: Not on file   Intimate Partner Violence: Not on file   Housing Stability: Not on file           ROS:  [x] All negative/unchanged except if checked.  Explain positive(checked items) below:  [] Constitutional  [] Eyes  [] Ear/Nose/Mouth/Throat  [] Respiratory  [] CV  [] GI  []   [] Musculoskeletal  [] Skin/Breast  [] Neurological  [] Endocrine  [] Heme/Lymph  [] Allergic/Immunologic    Explanation:     MEDICATIONS:    Current Facility-Administered Medications:     risperiDONE (RISPERDAL M-TABS) disintegrating tablet 2 mg, 2 mg, Oral, BID, Joi Arevalo MD, 2 mg at 07/16/22 1003    miconazole (MICOTIN) 2 % powder, , Topical, BID, DEZ Soto NP, Given at 07/16/22 1126    aspirin EC tablet 81 mg, 81 mg, Oral, Daily, Jimmy Johnson MD, 81 mg at 07/16/22 1003    atorvastatin (LIPITOR) tablet 40 mg, 40 mg, Oral, Nightly, Jimmy Johnson MD, 40 mg at 07/15/22 2124    fluocinonide (LIDEX) 0.05 % cream, , Topical, BID, Jimmy Johnson MD, Given at 07/16/22 1124    losartan (COZAAR) tablet 100 mg, 100 mg, Oral, Daily, Jimmy Johnson MD, 100 mg at 07/16/22 1003    metoprolol succinate (TOPROL XL) extended release tablet 25 mg, 25 mg, Oral, Daily, Jimmy Johnson MD, 25 mg at 07/16/22 1003    Hydrocerin cream CREA, , Topical, PRN, Jimmy Johnson MD, Given at 07/16/22 1125    acetaminophen (TYLENOL) tablet 650 mg, 650 mg, Oral, Q4H PRN, Joi Arevalo MD, 650 mg at 07/16/22 1002    polyethylene glycol (GLYCOLAX) packet 17 g, 17 g, Oral, Daily PRN, Joi Arevalo MD    traZODone (DESYREL) tablet 50 mg, 50 mg, Oral, Nightly PRN, Joi Arevalo MD, 50 mg at 07/13/22 2115    aluminum & magnesium hydroxide-simethicone (MAALOX) 200-200-20 MG/5ML suspension 30 mL, 30 mL, Oral, Q6H PRN, Joi Arevalo MD    benztropine mesylate (COGENTIN) injection 2 mg, 2 mg, IntraMUSCular, BID PRN, Joi Arevalo MD    hydrOXYzine pamoate (VISTARIL) capsule 50 mg, 50 mg, Oral, Q6H PRN, 50 mg at 07/13/22 2221 **OR** hydrOXYzine (VISTARIL) injection 50 mg, 50 mg, IntraMUSCular, Q6H PRN, Joi Arevalo MD    haloperidol (HALDOL) tablet 5 mg, 5 mg, Oral, Q6H PRN, 5 mg at 07/13/22 2221 **OR** haloperidol lactate (HALDOL) injection 5 mg, 5 mg, IntraMUSCular, Q6H PRN, Concepcion Mace MD      Examination:  /66   Pulse 76   Temp 97.9 °F (36.6 °C)   Resp 18   Ht 5' 10\" (1.778 m)   Wt (!) 428 lb (194.1 kg)   SpO2 98%   BMI 61.41 kg/m²   Gait - wide based  Medication side effects(SE): None    Mental Status Examination:    Level of consciousness:  within normal limits   Appearance:  poor grooming and poor hygiene  Behavior/Motor:  no abnormalities noted  Attitude toward examiner:  cooperative  Speech:  slow   Mood: still sad but stated he is improving   Affect:  mood congruent  Thought processes:  circumstanial    Thought content:  Paranoia improving. Perception: Denies auditory or visual hallucinations  Cognition:  oriented to person, place, and time   Concentration intact  Insight poor   Judgement poor     ASSESSMENT:   Patient symptoms are:  [] Well controlled  [x] Improving  [] Worsening  [] No change      Diagnosis:   Principal Problem:    Psychosis ([facility] [address]  Resolved Problems:    * No resolved hospital problems. *      LABS:    No results for input(s): WBC, HGB, PLT in the last 72 hours. No results for input(s): NA, K, CL, CO2, BUN, CREATININE, GLUCOSE in the last 72 hours. Recent Labs     07/14/22  0915   BILITOT 0.5   ALKPHOS 91   AST 21   ALT 19     Lab Results   Component Value Date/Time    LABAMPH Neg 07/12/2022 03:00 AM    BARBSCNU Neg 07/12/2022 03:00 AM    LABBENZ Neg 07/12/2022 03:00 AM    LABMETH Neg 07/12/2022 03:00 AM    OPIATESCREENURINE Neg 07/12/2022 03:00 AM    PHENCYCLIDINESCREENURINE Neg 07/12/2022 03:00 AM    ETOH <10 07/12/2022 04:34 AM     Lab Results   Component Value Date/Time    TSH 1.760 07/12/2022 04:34 AM     No results found for: LITHIUM  No results found for: VALPROATE, CBMZ    RISK ASSESSMENT: None    Treatment Plan:  Reviewed current Medications with the patient.    Continue Risperdal M-tabs 2 mg BID for paranoia and psychosis  Risks, benefits, side effects, drug-to-drug interactions and alternatives to treatment were discussed. Collateral information:   CD evaluation  Encourage patient to attend group and other milieu activities.   Discharge planning discussed with the patient and treatment team.    PSYCHOTHERAPY/COUNSELING:  [x] Therapeutic interview  [x] Supportive  [] CBT  [] Ongoing  [] Other    [x] Patient continues to need, on a daily basis, active treatment furnished directly by or requiring the supervision of inpatient psychiatric personnel      Anticipated Length of stay:4-7 days          Electronically signed by Sharon Echavarria MD on 7/16/2022 at 3:48 PM

## 2022-07-16 NOTE — PROGRESS NOTES
Pt took shower this am, ointments and power applied as ordered. Areas are much less with dry thick areas and redden areas are less red.

## 2022-07-16 NOTE — PROGRESS NOTES
Patient has spent time in the common area. He presents calm but during interaction shares paranoia as well as delusional thoughts. He expresses his belief that people were after him and that even his card playing friends were turned against him. He does not present with fear or watchfulness but does acknowledge his paranoia. Thoughts are less disorganized. Patient denies SI, HI. He does have body odor. He requested to shave and did so. Skin (psoriasis) is red, flaky with no oozing or discharge.

## 2022-07-16 NOTE — GROUP NOTE
Group Therapy Note    Date: 7/15/2022    Group Start Time: 1945  Group End Time: 2015  Group Topic: Recreational    [facility]    Yareli Alas RN        Group Therapy Note    Attendees: 6       Patient's Goal:  To attend group    Notes:  Minimal participation    Status After Intervention:  Unchanged    Participation Level:  Active Listener    Participation Quality: Appropriate and Attentive      Speech:  normal      Thought Process/Content: Logical      Affective Functioning: Congruent      Mood: euthymic      Level of consciousness:  Alert and Oriented x4      Response to Learning: Progressing to goal      Endings: None Reported    Modes of Intervention: Socialization      Discipline Responsible: Registered Nurse      Signature:  Yareli Alas RN

## 2022-07-16 NOTE — GROUP NOTE
Group Therapy Note    Date: 7/16/2022    Group Start Time: 1100  Group End Time: 3316  Group Topic: Psychoeducation    [facility]    HERNANDO Skelton, JULEE        Group Therapy Note    Attendees: 7       Patient's Goal:  to participate in a Psychoeducational group    Notes: Patient participated in sleep hygiene activity    Status After Intervention:  Improved    Participation Level: Interactive    Participation Quality: Sharing      Speech:  normal      Thought Process/Content: Logical      Affective Functioning: Congruent      Mood:  calm      Level of consciousness:  Alert      Response to Learning: Able to verbalize current knowledge/experience      Endings: None Reported    Modes of Intervention: Education      Discipline Responsible: /Counselor      Signature:  HERNANDO Skelton, JULEE

## 2022-07-17 PROCEDURE — 1240000000 HC EMOTIONAL WELLNESS R&B

## 2022-07-17 PROCEDURE — 6370000000 HC RX 637 (ALT 250 FOR IP): Performed by: STUDENT IN AN ORGANIZED HEALTH CARE EDUCATION/TRAINING PROGRAM

## 2022-07-17 PROCEDURE — 6370000000 HC RX 637 (ALT 250 FOR IP): Performed by: INTERNAL MEDICINE

## 2022-07-17 RX ADMIN — RISPERIDONE 2 MG: 2 TABLET, ORALLY DISINTEGRATING ORAL at 20:13

## 2022-07-17 RX ADMIN — ACETAMINOPHEN 650 MG: 325 TABLET ORAL at 10:07

## 2022-07-17 RX ADMIN — RISPERIDONE 2 MG: 2 TABLET, ORALLY DISINTEGRATING ORAL at 09:28

## 2022-07-17 RX ADMIN — MICONAZOLE NITRATE: 2 POWDER TOPICAL at 09:33

## 2022-07-17 RX ADMIN — MICONAZOLE NITRATE: 2 POWDER TOPICAL at 20:15

## 2022-07-17 RX ADMIN — FLUOCINONIDE: 0.5 CREAM TOPICAL at 09:32

## 2022-07-17 RX ADMIN — FLUOCINONIDE: 0.5 CREAM TOPICAL at 20:14

## 2022-07-17 RX ADMIN — LOSARTAN POTASSIUM 100 MG: 50 TABLET, FILM COATED ORAL at 09:28

## 2022-07-17 RX ADMIN — Medication: at 09:32

## 2022-07-17 RX ADMIN — ASPIRIN 81 MG: 81 TABLET, COATED ORAL at 09:28

## 2022-07-17 RX ADMIN — METOPROLOL SUCCINATE 25 MG: 25 TABLET, EXTENDED RELEASE ORAL at 09:28

## 2022-07-17 RX ADMIN — ATORVASTATIN CALCIUM 40 MG: 40 TABLET, FILM COATED ORAL at 20:13

## 2022-07-17 ASSESSMENT — PAIN SCALES - GENERAL
PAINLEVEL_OUTOF10: 5
PAINLEVEL_OUTOF10: 5

## 2022-07-17 ASSESSMENT — PAIN DESCRIPTION - DESCRIPTORS: DESCRIPTORS: ACHING

## 2022-07-17 ASSESSMENT — PAIN DESCRIPTION - ORIENTATION: ORIENTATION: RIGHT;LEFT

## 2022-07-17 ASSESSMENT — PAIN DESCRIPTION - LOCATION: LOCATION: FOOT

## 2022-07-17 NOTE — PROGRESS NOTES
Explained and gave all am meds, pt voiced understanding,Applied cream, oint, and powder as ordered, scattered red and dry areas are much better, cleansed under the breast and abd folds dry first, as they had much dried loose skin. Complete bed change done,  pt reports a shower this am, pt reports a little depression ,anxiety, thinks thoughts are clearer, pt reports he at too much sheila pizza is why he is so big. Pt reports he doesn't know what he will do on discharge , stated the help has been nice here to help him take care of  his self.

## 2022-07-17 NOTE — PROGRESS NOTES
Tatiana Mamt[facility]. FOLLOW-UP NOTE       7/17/2022     Patient was seen and examined in person, Chart reviewed   Patient's case discussed with staff/team    Chief Complaint: Paranoid ideations     Interim History:     Patient said he was feeling \"OK\". He said his sleep and appetite were \"OK\". He said his mood was \"OK\" and he denied having suicidal or homicidal ideation. He still has auditory hallucinations, \"a little, in the morning\" but said that \"they are better\". He said they are talking \"about what they are going to do with the facility here\". He denied visual hallucinations. He still has some paranoia. Appetite:  [x] Normal/Unchanged  [] Increased  [] Decreased      Sleep:       [x] Normal/Unchanged  [] Fair       [] Poor              Energy:    [x] Normal/Unchanged  [] Increased  [] Decreased        SI [] Present  [x] Absent    HI  []Present  [x] Absent     Aggression:  [] yes  [x] no        PAST MEDICAL/PSYCHIATRIC HISTORY:   Past Medical History:   Diagnosis Date    Adult BMI >=70 kg/sq m (HCC)     Essential hypertension, malignant        FAMILY/SOCIAL HISTORY:  History reviewed. No pertinent family history.   Social History     Socioeconomic History    Marital status: Single     Spouse name: Not on file    Number of children: Not on file    Years of education: Not on file    Highest education level: Not on file   Occupational History    Not on file   Tobacco Use    Smoking status: Never    Smokeless tobacco: Never   Vaping Use    Vaping Use: Never used   Substance and Sexual Activity    Alcohol use: Yes     Comment: occasionally    Drug use: Yes     Types: Marijuana Matt Schulz     Comment: occasionally    Sexual activity: Not on file   Other Topics Concern    Not on file   Social History Narrative    Not on file     Social Determinants of Health     Financial Resource Strain: Not on file   Food Insecurity: Not on file   Transportation Needs: Not on file   Physical Activity: Not on file Stress: Not on file   Social Connections: Not on file   Intimate Partner Violence: Not on file   Housing Stability: Not on file           ROS:  [x] All negative/unchanged except if checked.  Explain positive(checked items) below:  [] Constitutional  [] Eyes  [] Ear/Nose/Mouth/Throat  [] Respiratory  [] CV  [] GI  []   [] Musculoskeletal  [] Skin/Breast  [] Neurological  [] Endocrine  [] Heme/Lymph  [] Allergic/Immunologic    Explanation:     MEDICATIONS:    Current Facility-Administered Medications:     risperiDONE (RISPERDAL M-TABS) disintegrating tablet 2 mg, 2 mg, Oral, BID, Darlene Li MD, 2 mg at 07/17/22 0928    miconazole (MICOTIN) 2 % powder, , Topical, BID, DEZ Kramer NP, Given at 07/17/22 0933    aspirin EC tablet 81 mg, 81 mg, Oral, Daily, Chloe Marion MD, 81 mg at 07/17/22 0928    atorvastatin (LIPITOR) tablet 40 mg, 40 mg, Oral, Nightly, Chloe Marion MD, 40 mg at 07/16/22 2106    fluocinonide (LIDEX) 0.05 % cream, , Topical, BID, Chloe Marion MD, Given at 07/17/22 0932    losartan (COZAAR) tablet 100 mg, 100 mg, Oral, Daily, Chloe Marion MD, 100 mg at 07/17/22 5198    metoprolol succinate (TOPROL XL) extended release tablet 25 mg, 25 mg, Oral, Daily, Chloe Marion MD, 25 mg at 07/17/22 3020    Hydrocerin cream CREA, , Topical, PRN, Chloe Marion MD, Given at 07/17/22 0932    acetaminophen (TYLENOL) tablet 650 mg, 650 mg, Oral, Q4H PRN, Darlene Li MD, 650 mg at 07/17/22 1007    polyethylene glycol (GLYCOLAX) packet 17 g, 17 g, Oral, Daily PRN, Darlene Li MD    traZODone (DESYREL) tablet 50 mg, 50 mg, Oral, Nightly PRN, Darlene Li MD, 50 mg at 07/13/22 2115    aluminum & magnesium hydroxide-simethicone (MAALOX) 200-200-20 MG/5ML suspension 30 mL, 30 mL, Oral, Q6H PRN, Darlene Li MD    benztropine mesylate (COGENTIN) injection 2 mg, 2 mg, IntraMUSCular, BID PRN, Darlene Li MD    hydrOXYzine pamoate (VISTARIL) capsule 50 mg, 50 mg, Oral, Q6H PRN, 50 mg at 07/13/22 3823 **OR** hydrOXYzine (VISTARIL) injection 50 mg, 50 mg, IntraMUSCular, Q6H PRN, Pallavi Mckeon MD    haloperidol (HALDOL) tablet 5 mg, 5 mg, Oral, Q6H PRN, 5 mg at 07/13/22 2221 **OR** haloperidol lactate (HALDOL) injection 5 mg, 5 mg, IntraMUSCular, Q6H PRN, Pallavi Mckeon MD      Examination:  BP (!) 141/72   Pulse 73   Temp 97.9 °F (36.6 °C)   Resp 18   Ht 5' 10\" (1.778 m)   Wt (!) 428 lb (194.1 kg)   SpO2 99%   BMI 61.41 kg/m²   Gait - wide based  Medication side effects(SE): None    Mental Status Examination:    Level of consciousness:  within normal limits   Appearance:  poor grooming and poor hygiene  Behavior/Motor:  no abnormalities noted  Attitude toward examiner:  cooperative  Speech:  slow   Mood: still sad but stated he is improving   Affect:  mood congruent  Thought processes:  circumstanial    Thought content:  Paranoia improving. Perception: Auditory hallucinations  Cognition:  oriented to person, place, and time   Concentration intact  Insight poor   Judgement poor     ASSESSMENT:   Patient symptoms are:  [] Well controlled  [x] Improving  [] Worsening  [] No change      Diagnosis:   Psychotic disorder, NOS      LABS:    No results for input(s): WBC, HGB, PLT in the last 72 hours. No results for input(s): NA, K, CL, CO2, BUN, CREATININE, GLUCOSE in the last 72 hours. No results for input(s): BILITOT, ALKPHOS, AST, ALT in the last 72 hours. Lab Results   Component Value Date/Time    LABAMPH Neg 07/12/2022 03:00 AM    BARBSCNU Neg 07/12/2022 03:00 AM    LABBENZ Neg 07/12/2022 03:00 AM    LABMETH Neg 07/12/2022 03:00 AM    OPIATESCREENURINE Neg 07/12/2022 03:00 AM    PHENCYCLIDINESCREENURINE Neg 07/12/2022 03:00 AM    ETOH <10 07/12/2022 04:34 AM     Lab Results   Component Value Date/Time    TSH 1.760 07/12/2022 04:34 AM     No results found for: LITHIUM  No results found for: VALPROATE, CBMZ    RISK ASSESSMENT: None    Treatment Plan:  Reviewed current Medications with the patient.    Continue Risperdal M-tab 2 mg BID for paranoia and psychosis  Risks, benefits, side effects, drug-to-drug interactions and alternatives to treatment were discussed. Collateral information:   CD evaluation  Encourage patient to attend group and other milieu activities.   Discharge planning discussed with the patient and treatment team.    PSYCHOTHERAPY/COUNSELING:  [x] Therapeutic interview  [x] Supportive  [] CBT  [] Ongoing  [] Other    [x] Patient continues to need, on a daily basis, active treatment furnished directly by or requiring the supervision of inpatient psychiatric personnel      Anticipated Length of stay: 4-7 days          Electronically signed by Camilo Haas MD on 7/17/2022 at 5:04 PM

## 2022-07-17 NOTE — PROGRESS NOTES
Patient has been out in the common area and has attended groups. Affect is pleasant and patient is directable. At time thoughts are incongruent and other times more organized. Denies SI, HI, or hallucinations. Expresses paranoid thoughts.

## 2022-07-17 NOTE — PROGRESS NOTES
Pt has been social with peers, noted to have performed a short dance movement routine in the dinning room with a steady gait and all peers commented positively.

## 2022-07-17 NOTE — PROGRESS NOTES
Morning 2 [facility] Natalie attended the morning community meeting on 7/17/22. Topics discussed today     [x] Introduction  Day of the week and date  Mask distribution  Current mask requirements  [x]Teams  Explanation of  Green and Blue team criteria  Nurses assigned to each team for today  Explanation about green and blue paper  Date  Patient's Name  Patient's Nurse  Goals  [x] Visitation  Announce the visiting hours for the day  Announce which team is allowed to have visitors for the day  Review any updated Covid 19 requirements for visitors during visitation  Vaccine Card or negative Covid test within 48 hours of visit  State Identification  Patients are reminded to alert the  at least 1 hour before visitation   [x] Unit Orientation  Coffee use  Phone location and etiquette  Shower locations  [place] and dryer location and process  Common area expectations  Staff rounds expectation  [x] Meals   Educate patient to the menu  The patient is encouraged to fill out the menu to get preferences at mealtime  The patient is educated that if they do not fill out the menu, they will get the standard tray  The coffee pot is decaf, patient encouraged to order regular coffee from menu.   Educate patient to the meal process  Patient encouraged to eat snacks provided twice daily  Snacks may stay in patient room     [x] Discharge Process  Discharge expectations  Fill out the survey after discharge   [x] Hygiene  Daily showers encouraged  Showers availability discussed   Daily dressing encouraged  Discussed wearing street clothing  Education provided on where to place linens and clothing  Linens in the hamper  personal clothing does not go into the linen hamper  [x] Group   Patient encouraged to attend group provided  Time of Group Meetings discussed  Gentle reminder that attendance is a Physician order  [x] Movement  Chair exercises completed  Stretching completed  Notes:  Patient did not state a goal

## 2022-07-17 NOTE — GROUP NOTE
Group Therapy Note    Date: 7/17/2022    Group Start Time: 1000  Group End Time: 1100  Group Topic: Activity    [facility]    HERNANDO Salazar LSW        Group Therapy Note    Attendees: 7       Patient's Goal:  to participate in a group activity. Notes:  Patient participated in [employer]. He is interactive with his peers.      Status After Intervention:  Improved    Participation Level: Interactive    Participation Quality: Appropriate      Speech:  normal      Thought Process/Content: Logical      Affective Functioning: Congruent      Mood:  calm      Level of consciousness:  Alert      Response to Learning: Able to verbalize current knowledge/experience      Endings: None Reported    Modes of Intervention: Education      Discipline Responsible: /Counselor      Signature:  HERNANDO Salazar LSW

## 2022-07-18 PROCEDURE — 6370000000 HC RX 637 (ALT 250 FOR IP): Performed by: STUDENT IN AN ORGANIZED HEALTH CARE EDUCATION/TRAINING PROGRAM

## 2022-07-18 PROCEDURE — 1240000000 HC EMOTIONAL WELLNESS R&B

## 2022-07-18 PROCEDURE — 99232 SBSQ HOSP IP/OBS MODERATE 35: CPT | Performed by: STUDENT IN AN ORGANIZED HEALTH CARE EDUCATION/TRAINING PROGRAM

## 2022-07-18 PROCEDURE — 6370000000 HC RX 637 (ALT 250 FOR IP): Performed by: INTERNAL MEDICINE

## 2022-07-18 RX ORDER — RISPERIDONE 2 MG/1
2 TABLET, ORALLY DISINTEGRATING ORAL 2 TIMES DAILY
Status: DISCONTINUED | OUTPATIENT
Start: 2022-07-18 | End: 2022-07-20

## 2022-07-18 RX ADMIN — METOPROLOL SUCCINATE 25 MG: 25 TABLET, EXTENDED RELEASE ORAL at 08:41

## 2022-07-18 RX ADMIN — RISPERIDONE 2 MG: 2 TABLET, ORALLY DISINTEGRATING ORAL at 21:07

## 2022-07-18 RX ADMIN — ASPIRIN 81 MG: 81 TABLET, COATED ORAL at 08:41

## 2022-07-18 RX ADMIN — MICONAZOLE NITRATE: 2 POWDER TOPICAL at 21:09

## 2022-07-18 RX ADMIN — RISPERIDONE 2 MG: 2 TABLET, ORALLY DISINTEGRATING ORAL at 08:41

## 2022-07-18 RX ADMIN — LOSARTAN POTASSIUM 100 MG: 50 TABLET, FILM COATED ORAL at 08:41

## 2022-07-18 RX ADMIN — ATORVASTATIN CALCIUM 40 MG: 40 TABLET, FILM COATED ORAL at 21:07

## 2022-07-18 RX ADMIN — MICONAZOLE NITRATE: 2 POWDER TOPICAL at 13:28

## 2022-07-18 RX ADMIN — FLUOCINONIDE: 0.5 CREAM TOPICAL at 21:08

## 2022-07-18 RX ADMIN — FLUOCINONIDE: 0.5 CREAM TOPICAL at 13:28

## 2022-07-18 NOTE — PROGRESS NOTES
Patient is social at times and is friendly and jokes. He retreats to room for naps. He presents more organized but still has paranoid thoughts.  No SI, HI.

## 2022-07-18 NOTE — PROGRESS NOTES
Morning 2 [facility] Natalie attended the morning community meeting on 7/18/22. Topics discussed today     [x] Introduction  Day of the week and date  Mask distribution  Current mask requirements  [x]Teams  Explanation of  Green and Blue team criteria  Nurses assigned to each team for today  Explanation about green and blue paper  Date  Patient's Name  Patient's Nurse  Goals  [x] Visitation  Announce the visiting hours for the day  Announce which team is allowed to have visitors for the day  Review any updated Covid 19 requirements for visitors during visitation  Vaccine Card or negative Covid test within 48 hours of visit  State Identification  Patients are reminded to alert the  at least 1 hour before visitation   [x] Unit Orientation  Coffee use  Phone location and etiquette  Shower locations  [place] and dryer location and process  Common area expectations  Staff rounds expectation  [x] Meals   Educate patient to the menu  The patient is encouraged to fill out the menu to get preferences at mealtime  The patient is educated that if they do not fill out the menu, they will get the standard tray  The coffee pot is decaf, patient encouraged to order regular coffee from menu.   Educate patient to the meal process  Patient encouraged to eat snacks provided twice daily  Snacks may stay in patient room     [x] Discharge Process  Discharge expectations  Fill out the survey after discharge   [x] Hygiene  Daily showers encouraged  Showers availability discussed   Daily dressing encouraged  Discussed wearing street clothing  Education provided on where to place linens and clothing  Linens in the hamper  personal clothing does not go into the linen hamper  [x] Group   Patient encouraged to attend group provided  Time of Group Meetings discussed  Gentle reminder that attendance is a Physician order  [x] Movement  Chair exercises completed  Stretching completed  Notes: Goal - \"To attend all the groups\" Electronically signed by Ben Nicole, [address] on 7/18/2022 at 9:37 AM

## 2022-07-18 NOTE — GROUP NOTE
Group Therapy Note    Date: 7/17/2022    Group Start Time: 1630  Group End Time: 1700  Group Topic: Healthy Living/Wellness    [facility]    Wil Zamora        Group Therapy Note    Attendees: 9       Patient's Goal:  To discuss healthy coping skills by participating in a game    Notes:  Pt actively participated in group activity    Status After Intervention:  Improved    Participation Level:  Active Listener and Interactive    Participation Quality: Appropriate and Attentive      Speech:  normal      Thought Process/Content: Logical      Affective Functioning: Congruent      Mood: euthymic      Level of consciousness:  Alert      Response to Learning: Able to verbalize current knowledge/experience      Endings: None Reported    Modes of Intervention: Education, Socialization, and Activity      Discipline Responsible: Behavorial Health Tech      Signature:  Wil Zamora

## 2022-07-18 NOTE — PROGRESS NOTES
Pt has been out in day room, is social with select peers. On assessment pt endorses anxiety and depression, unable to give a rating. Pt denies any SI, HI, AVH. Reports good sleep and appetite. Pt states his stressors include what his family is doing with his money and why he is here. Asked pt if he knew why he was here and pt states \"Because people were after me. \" Pt encouraged to shower and attend groups. Pt reports he showered this AM but staff report otherwise.

## 2022-07-18 NOTE — GROUP NOTE
Group Therapy Note    Date: 7/18/2022    Group Start Time: 1300  Group End Time: 1400  Group Topic: Music Therapy    [facility]    Jimmy Ramos RN        Group Therapy Note    Attendees: 9/18       Patient's Goal:  To participate in music therapy group. Notes:  Pt actively participated in group.     Status After Intervention:  Improved    Participation Level: Interactive    Participation Quality: Appropriate      Speech:  normal      Thought Process/Content: Logical      Affective Functioning: Congruent      Mood:  WNL      Level of consciousness:  Alert, Oriented x4, and Attentive      Response to Learning: Progressing to goal      Endings: None Reported    Modes of Intervention: Activity      Discipline Responsible: Registered Nurse      Signature:  Jimmy Ramos RN

## 2022-07-18 NOTE — GROUP NOTE
Group Therapy Note    Date: 7/18/2022    Group Start Time: 1105  Group End Time: 1140  Group Topic: Psychotherapy    [facility]    Nicole Barrera, [facility]        Group Therapy Note    Attendees: 10       Patient's Goal:  relationship rights    Notes:  patient participated    Status After Intervention:  Improved    Participation Level: Interactive    Participation Quality: Appropriate      Speech:  normal      Thought Process/Content: Logical      Affective Functioning: Congruent      Mood: anxious      Level of consciousness:  Alert      Response to Learning: Progressing to goal      Endings: None Reported    Modes of Intervention: Support      Discipline Responsible: /Counselor      Signature:  Hunter Fernandez, [facility]

## 2022-07-18 NOTE — PROGRESS NOTES
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 He also talked about him being a  in college. He stated he is less paranoid about others. He currently denies AVH, and SI, and HI. He denies side effects about the medications. He stated he talked to his father and stated he is doing better. Patient reported that he is unsure with an injection. He only wants to take oral tablets. Appetite:  [x] Normal/Unchanged  [] Increased  [] Decreased      Sleep:       [x] Normal/Unchanged  [] Fair       [] Poor              Energy:    [x] Normal/Unchanged  [] Increased  [] Decreased        SI [] Present  [x] Absent    HI  []Present  [x] Absent     Aggression:  [] yes  [x] no    Patient is [x] able  [] unable to CONTRACT FOR SAFETY     PAST MEDICAL/PSYCHIATRIC HISTORY:   Past Medical History:   Diagnosis Date    Adult BMI >=70 kg/sq m ([facility] [address]     Essential hypertension, malignant        FAMILY/SOCIAL HISTORY:  History reviewed. No pertinent family history.   Social History     Socioeconomic History    Marital status: Single     Spouse name: Not on file    Number of children: Not on file    Years of education: Not on file    Highest education level: Not on file   Occupational History    Not on file   Tobacco Use    Smoking status: Never    Smokeless tobacco: Never   Vaping Use    Vaping Use: Never used   Substance and Sexual Activity    Alcohol use: Yes     Comment: occasionally    Drug use: Yes     Types: Marijuana Sobeida Brenda)     Comment: occasionally    Sexual activity: Not on file   Other Topics Concern    Not on file   Social History Narrative    Not on file     Social Determinants of Health     Financial Resource Strain: Not on file   Food Insecurity: Not on file Transportation Needs: Not on file   Physical Activity: Not on file   Stress: Not on file   Social Connections: Not on file   Intimate Partner Violence: Not on file   Housing Stability: Not on file           ROS:  [x] All negative/unchanged except if checked.  Explain positive(checked items) below:  [] Constitutional  [] Eyes  [] Ear/Nose/Mouth/Throat  [] Respiratory  [] CV  [] GI  []   [] Musculoskeletal  [] Skin/Breast  [] Neurological  [] Endocrine  [] Heme/Lymph  [] Allergic/Immunologic    Explanation:     MEDICATIONS:    Current Facility-Administered Medications:     risperiDONE (RISPERDAL M-TABS) disintegrating tablet 2 mg, 2 mg, Oral, BID, Lisbet Robledo MD, 2 mg at 07/18/22 0841    miconazole (MICOTIN) 2 % powder, , Topical, BID, DEZ Thompson NP, Given at 07/17/22 2015    aspirin EC tablet 81 mg, 81 mg, Oral, Daily, Fredi Torres MD, 81 mg at 07/18/22 0841    atorvastatin (LIPITOR) tablet 40 mg, 40 mg, Oral, Nightly, Fredi Torres MD, 40 mg at 07/17/22 2013    fluocinonide (LIDEX) 0.05 % cream, , Topical, BID, Fredi Torres MD, Given at 07/17/22 2014    losartan (COZAAR) tablet 100 mg, 100 mg, Oral, Daily, Fredi Torres MD, 100 mg at 07/18/22 0841    metoprolol succinate (TOPROL XL) extended release tablet 25 mg, 25 mg, Oral, Daily, Fredi Torres MD, 25 mg at 07/18/22 0841    Hydrocerin cream CREA, , Topical, PRN, Fredi Torres MD, Given at 07/17/22 0932    acetaminophen (TYLENOL) tablet 650 mg, 650 mg, Oral, Q4H PRN, Lisbet Robledo MD, 650 mg at 07/17/22 1007    polyethylene glycol (GLYCOLAX) packet 17 g, 17 g, Oral, Daily PRN, Lisbet Robledo MD    traZODone (DESYREL) tablet 50 mg, 50 mg, Oral, Nightly PRN, Lisbet Robledo MD, 50 mg at 07/13/22 2115    aluminum & magnesium hydroxide-simethicone (MAALOX) 200-200-20 MG/5ML suspension 30 mL, 30 mL, Oral, Q6H PRN, Lisbet Robledo MD    benztropine mesylate (COGENTIN) injection 2 mg, 2 mg, IntraMUSCular, BID PRN, Lisbet Robledo MD    hydrOXYzine pamoate (VISTARIL) capsule 50 mg, 50 mg, Oral, Q6H PRN, 50 mg at 07/13/22 2221 **OR** hydrOXYzine (VISTARIL) injection 50 mg, 50 mg, IntraMUSCular, Q6H PRN, Darlene Li MD    haloperidol (HALDOL) tablet 5 mg, 5 mg, Oral, Q6H PRN, 5 mg at 07/13/22 2221 **OR** haloperidol lactate (HALDOL) injection 5 mg, 5 mg, IntraMUSCular, Q6H PRN, Darlene Li MD      Examination:  BP (!) 116/56   Pulse 84   Temp 97.9 °F (36.6 °C) (Oral)   Resp 20   Ht 5' 10\" (1.778 m)   Wt (!) 428 lb (194.1 kg)   SpO2 97%   BMI 61.41 kg/m²   Gait - steady  Medication side effects(SE): Denies    Mental Status Examination:    Level of consciousness:  within normal limits   Appearance:  poor grooming and fair hygiene  Behavior/Motor:  no abnormalities noted  Attitude toward examiner:  guarded  Speech:  normal rate and monotone   Mood: anxious  Affect:  mood congruent  Thought processes:  tangential   Thought content:  Suicidal Ideation:  denies suicidal ideation, without plan, and without intent  Delusions:  paranoid  Perceptual Disturbance:  denies any perceptual disturbance  Cognition:  oriented to person, place, and time   Concentration intact  Insight poor   Judgement poor     ASSESSMENT:   Patient symptoms are:  [] Well controlled  [x] Improving  [] Worsening  [] No change      Diagnosis:   Principal Problem:    Psychosis ([facility] [address]  Resolved Problems:    * No resolved hospital problems. *      LABS:    No results for input(s): WBC, HGB, PLT in the last 72 hours. No results for input(s): NA, K, CL, CO2, BUN, CREATININE, GLUCOSE in the last 72 hours. No results for input(s): BILITOT, ALKPHOS, AST, ALT in the last 72 hours.   Lab Results   Component Value Date/Time    LABAMPH Neg 07/12/2022 03:00 AM    BARBSCNU Neg 07/12/2022 03:00 AM    LABBENZ Neg 07/12/2022 03:00 AM    LABMETH Neg 07/12/2022 03:00 AM    OPIATESCREENURINE Neg 07/12/2022 03:00 AM    PHENCYCLIDINESCREENURINE Neg 07/12/2022 03:00 AM    ETOH <10 07/12/2022 04:34 AM     Lab Results   Component Value Date/Time TSH 1.760 07/12/2022 04:34 AM     No results found for: LITHIUM  No results found for: VALPROATE, CBMZ    RISK ASSESSMENT: Denies SI with an intent or plan    Treatment Plan:  Reviewed current Medications with the patient. Risks, benefits, side effects, drug-to-drug interactions and alternatives to treatment were discussed. Collateral information: Father   CD evaluation  Encourage patient to attend group and other milieu activities.   Discharge planning discussed with the patient and treatment team.    PSYCHOTHERAPY/COUNSELING:  [x] Therapeutic interview  [x] Supportive  [] CBT  [] Ongoing  [] Other    [x] Patient continues to need, on a daily basis, active treatment furnished directly by or requiring the supervision of inpatient psychiatric personnel      Anticipated Length of stay:3-4 days       Electronically signed by Genesis Sandoval MD on 7/18/2022 at 10:50 AM

## 2022-07-18 NOTE — GROUP NOTE
Group Therapy Note    Date: 7/17/2022    Group Start Time: 2030  Group End Time: 2040  Group Topic: Wrap-Up    [facility] Virgen        Group Therapy Note    Attendees: 8       Patient's Goal:  \"to be mellow\"    Notes:  Patient shared that he made progress on his goal and shared that he had an overall good day     Status After Intervention:  Improved    Participation Level:  Active Listener    Participation Quality: Appropriate      Speech:  normal      Thought Process/Content: Logical      Affective Functioning: Congruent      Mood: euthymic      Level of consciousness:  Alert      Response to Learning: Progressing to goal      Endings: None Reported    Modes of Intervention: Socialization      Discipline Responsible: Behavorial Health Tech      Signature:  Radha Virgen

## 2022-07-18 NOTE — GROUP NOTE
Group Therapy Note    Date: 7/18/2022    Group Start Time: 1000  Group End Time: 1050  Group Topic: Psychoeducation    [facility]    Beacher Coombe        Group Therapy Note    Attendees: 7       Patient's Goal:  \"To attend all the groups\"    Notes:  Patient attended the 1000 skills group. Patient was more animated, more talkative and sociable in group. He worked fairly well on his task.     Status After Intervention:  Improved    Participation Level: Fairly well    Participation Quality: Appropriate      Speech:  normal      Thought Process/Content: Linear      Affective Functioning: Congruent      Mood:  calm      Level of consciousness:  Alert      Response to Learning: Progressing to goal      Endings: None Reported    Modes of Intervention: Education, Socialization, and Activity      Discipline Responsible: Psychoeducational Specialist      Signature:  Francisco Javier Sunshine

## 2022-07-19 PROCEDURE — 1240000000 HC EMOTIONAL WELLNESS R&B

## 2022-07-19 PROCEDURE — 99232 SBSQ HOSP IP/OBS MODERATE 35: CPT | Performed by: STUDENT IN AN ORGANIZED HEALTH CARE EDUCATION/TRAINING PROGRAM

## 2022-07-19 PROCEDURE — 6370000000 HC RX 637 (ALT 250 FOR IP): Performed by: INTERNAL MEDICINE

## 2022-07-19 PROCEDURE — 6370000000 HC RX 637 (ALT 250 FOR IP): Performed by: STUDENT IN AN ORGANIZED HEALTH CARE EDUCATION/TRAINING PROGRAM

## 2022-07-19 RX ADMIN — LOSARTAN POTASSIUM 100 MG: 50 TABLET, FILM COATED ORAL at 08:40

## 2022-07-19 RX ADMIN — FLUOCINONIDE: 0.5 CREAM TOPICAL at 12:19

## 2022-07-19 RX ADMIN — RISPERIDONE 2 MG: 2 TABLET, ORALLY DISINTEGRATING ORAL at 20:24

## 2022-07-19 RX ADMIN — ATORVASTATIN CALCIUM 40 MG: 40 TABLET, FILM COATED ORAL at 20:24

## 2022-07-19 RX ADMIN — FLUOCINONIDE: 0.5 CREAM TOPICAL at 20:25

## 2022-07-19 RX ADMIN — Medication: at 12:20

## 2022-07-19 RX ADMIN — ASPIRIN 81 MG: 81 TABLET, COATED ORAL at 08:40

## 2022-07-19 RX ADMIN — ACETAMINOPHEN 650 MG: 325 TABLET ORAL at 08:40

## 2022-07-19 RX ADMIN — MICONAZOLE NITRATE: 2 POWDER TOPICAL at 20:24

## 2022-07-19 RX ADMIN — METOPROLOL SUCCINATE 25 MG: 25 TABLET, EXTENDED RELEASE ORAL at 08:40

## 2022-07-19 RX ADMIN — MICONAZOLE NITRATE: 2 POWDER TOPICAL at 12:20

## 2022-07-19 RX ADMIN — RISPERIDONE 2 MG: 2 TABLET, ORALLY DISINTEGRATING ORAL at 08:40

## 2022-07-19 ASSESSMENT — PAIN SCALES - GENERAL
PAINLEVEL_OUTOF10: 5
PAINLEVEL_OUTOF10: 0

## 2022-07-19 ASSESSMENT — PAIN DESCRIPTION - DESCRIPTORS: DESCRIPTORS: ACHING

## 2022-07-19 ASSESSMENT — PAIN DESCRIPTION - LOCATION: LOCATION: BACK

## 2022-07-19 NOTE — PROGRESS NOTES
Spiritual Support Group Note    Number of Participants in Group: 4                        Time: 15:00-15:40    Goal: Relief from isolation and loneliness             Rosey Sharing             Self-understanding and gain insight              Acceptance and belonging            Recognize they are not alone                Socialization             Empowerment       Encouragement    Topic:  [x] Spiritual Wellness and Self Care                  [] Hope                     [x] Connecting with Divine/Others        [] Thankfulness and Gratitude               []  Meaningfulness and Purpose               [] Forgiveness               [] Peace               [] Connect to [facility]      [] Other    Participation Level:   [x] Active Listener   [] Minimal   [] Monopolizing   [x] Interactive   [] No Participation   []  Other:     Attention:   [x] Alert   [] Distractible   [] Drowsy   [] Poor   [] Other:    Manner:   [x] Cooperative   [] Suspicious   [] Withdrawn   [] Guarded   [] Irritable   [] Inhospitable   [] Other:     Others Comments from Group:

## 2022-07-19 NOTE — GROUP NOTE
Group Therapy Note    Date: 7/19/2022    Group Start Time: 2015  Group End Time: 2030  Group Topic: Wrap-Up    [facility]    Jessika Almanza RN        Group Therapy Note    Attendees: 13       Patient's Goal:  To survive    Notes:  Progressing towards goal    Status After Intervention:  Unchanged    Participation Level:  Active Listener    Participation Quality: Appropriate      Speech:  normal      Thought Process/Content: Logical      Affective Functioning: Congruent      Mood: euthymic      Level of consciousness:  Alert      Response to Learning: Progressing to goal      Endings: None Reported    Modes of Intervention: Support      Discipline Responsible: Registered Nurse      Signature:  Jessika Almanza RN

## 2022-07-19 NOTE — PROGRESS NOTES
[address] [facility] NOTE       7/19/2022     Patient was seen and examined in person, Chart reviewed   Patient's case discussed with staff/team    Chief Complaint: Paranoia     Interim History:   Patient reported that he is doing better since he was admitted. He is less paranoid and denies hearing voices. Denies SI with an intent or plan. Denies HI at others. Appetite:  [x] Normal/Unchanged  [] Increased  [] Decreased      Sleep:       [x] Normal/Unchanged  [] Fair       [] Poor              Energy:    [x] Normal/Unchanged  [] Increased  [] Decreased        SI [] Present  [x] Absent    HI  []Present  [x] Absent     Aggression:  [] yes  [x] no    Patient is [x] able  [] unable to CONTRACT FOR SAFETY     PAST MEDICAL/PSYCHIATRIC HISTORY:   Past Medical History:   Diagnosis Date    Adult BMI >=70 kg/sq m ([facility] [address]     Essential hypertension, malignant        FAMILY/SOCIAL HISTORY:  History reviewed. No pertinent family history.   Social History     Socioeconomic History    Marital status: Single     Spouse name: Not on file    Number of children: Not on file    Years of education: Not on file    Highest education level: Not on file   Occupational History    Not on file   Tobacco Use    Smoking status: Never    Smokeless tobacco: Never   Vaping Use    Vaping Use: Never used   Substance and Sexual Activity    Alcohol use: Yes     Comment: occasionally    Drug use: Yes     Types: Marijuana Darryle Pimple)     Comment: occasionally    Sexual activity: Not on file   Other Topics Concern    Not on file   Social History Narrative    Not on file     Social Determinants of Health     Financial Resource Strain: Not on file   Food Insecurity: Not on file   Transportation Needs: Not on file   Physical Activity: Not on file   Stress: Not on file   Social Connections: Not on file   Intimate Partner Violence: Not on file   Housing Stability: Not on file           ROS:  [x] All negative/unchanged except if checked.  Explain positive(checked items) below:  [] Constitutional  [] Eyes  [] Ear/Nose/Mouth/Throat  [] Respiratory  [] CV  [] GI  []   [] Musculoskeletal  [] Skin/Breast  [] Neurological  [] Endocrine  [] Heme/Lymph  [] Allergic/Immunologic    Explanation:     MEDICATIONS:    Current Facility-Administered Medications:     risperiDONE (RISPERDAL M-TABS) disintegrating tablet 2 mg, 2 mg, Oral, BID, Avinash Salinas MD, 2 mg at 07/19/22 0840    miconazole (MICOTIN) 2 % powder, , Topical, BID, DEZ Chinchilla NP, Given at 07/18/22 2109    aspirin EC tablet 81 mg, 81 mg, Oral, Daily, Julieta Sanders MD, 81 mg at 07/19/22 0840    atorvastatin (LIPITOR) tablet 40 mg, 40 mg, Oral, Nightly, Julieta Sanders MD, 40 mg at 07/18/22 2107    fluocinonide (LIDEX) 0.05 % cream, , Topical, BID, Julieta Sanders MD, Given at 07/18/22 2108    losartan (COZAAR) tablet 100 mg, 100 mg, Oral, Daily, Julieta Sanders MD, 100 mg at 07/19/22 0840    metoprolol succinate (TOPROL XL) extended release tablet 25 mg, 25 mg, Oral, Daily, Julieta Sanders MD, 25 mg at 07/19/22 0840    Hydrocerin cream CREA, , Topical, PRN, Julitea Sanders MD, Given at 07/17/22 0932    acetaminophen (TYLENOL) tablet 650 mg, 650 mg, Oral, Q4H PRN, Aviansh Salinas MD, 650 mg at 07/19/22 0840    polyethylene glycol (GLYCOLAX) packet 17 g, 17 g, Oral, Daily PRN, Avinash Salinas MD    traZODone (DESYREL) tablet 50 mg, 50 mg, Oral, Nightly PRN, Avinash Salinas MD, 50 mg at 07/13/22 2115    aluminum & magnesium hydroxide-simethicone (MAALOX) 200-200-20 MG/5ML suspension 30 mL, 30 mL, Oral, Q6H PRN, Avinash Salinas MD    benztropine mesylate (COGENTIN) injection 2 mg, 2 mg, IntraMUSCular, BID PRN, Avinash Salinas MD    hydrOXYzine pamoate (VISTARIL) capsule 50 mg, 50 mg, Oral, Q6H PRN, 50 mg at 07/13/22 2221 **OR** hydrOXYzine (VISTARIL) injection 50 mg, 50 mg, IntraMUSCular, Q6H PRN, Avinash Salinas MD    haloperidol (HALDOL) tablet 5 mg, 5 mg, Oral, Q6H PRN, 5 mg at 07/13/22 2221 **OR** haloperidol lactate (HALDOL) injection 5 mg, 5 mg, IntraMUSCular, Q6H PRN, Leonor Harding MD      Examination:  /66   Pulse 78   Temp 97.9 °F (36.6 °C)   Resp 18   Ht 5' 10\" (1.778 m)   Wt (!) 428 lb (194.1 kg)   SpO2 100%   BMI 61.41 kg/m²   Gait -  wide based   Medication side effects(SE): Denies     Mental Status Examination:    Level of consciousness:  within normal limits   Appearance:  fair grooming and fair hygiene  Behavior/Motor:  no abnormalities noted  Attitude toward examiner:  guarded  Speech:  normal rate and normal volume   Mood: anxious  Affect:  mood congruent  Thought processes:  coherent   Thought content:  Suicidal Ideation:  denies suicidal ideation, without plan, and without intent  Delusions:  no evidence of delusions  Perceptual Disturbance:  denies any perceptual disturbance  Cognition:  oriented to person, place, and time   Concentration intact  Insight improving   Judgement imprving     ASSESSMENT:   Patient symptoms are:  [] Well controlled  [x] Improving  [] Worsening  [] No change      Diagnosis:   Principal Problem:    Psychosis ([address]  Resolved Problems:    * No resolved hospital problems. *      LABS:    No results for input(s): WBC, HGB, PLT in the last 72 hours. No results for input(s): NA, K, CL, CO2, BUN, CREATININE, GLUCOSE in the last 72 hours. No results for input(s): BILITOT, ALKPHOS, AST, ALT in the last 72 hours.   Lab Results   Component Value Date/Time    LABAMPH Neg 07/12/2022 03:00 AM    BARBSCNU Neg 07/12/2022 03:00 AM    LABBENZ Neg 07/12/2022 03:00 AM    LABMETH Neg 07/12/2022 03:00 AM    OPIATESCREENURINE Neg 07/12/2022 03:00 AM    PHENCYCLIDINESCREENURINE Neg 07/12/2022 03:00 AM    ETOH <10 07/12/2022 04:34 AM     Lab Results   Component Value Date/Time    TSH 1.760 07/12/2022 04:34 AM     No results found for: LITHIUM  No results found for: VALPROATE, CBMZ    RISK ASSESSMENT:  Denies SI with an intent or plan     Treatment Plan:  Reviewed current Medications with the patient. Continue the current dose of Risperdal  Risks, benefits, side effects, drug-to-drug interactions and alternatives to treatment were discussed. Collateral information: Father  CD evaluation  Encourage patient to attend group and other milieu activities.   Discharge planning discussed with the patient and treatment team.    PSYCHOTHERAPY/COUNSELING:  [x] Therapeutic interview  [x] Supportive  [] CBT  [] Ongoing  [] Other    [x] Patient continues to need, on a daily basis, active treatment furnished directly by or requiring the supervision of inpatient psychiatric personnel      Anticipated Length of stay: 2-3 days       Electronically signed by Ofe Atkins MD on 7/19/2022 at 11:25 AM

## 2022-07-19 NOTE — GROUP NOTE
Group Therapy Note    Date: 7/19/2022    Group Start Time: 1110  Group End Time: 1140  Group Topic: Psychotherapy    [facility]        [facility] Note    Attendees: 6       Patient's Goal:  to discuss resources    Notes:  patient participated    Status After Intervention:  Improved    Participation Level: Interactive    Participation Quality: Appropriate      Speech:  normal      Thought Process/Content: Logical      Affective Functioning: Congruent      Mood: anxious      Level of consciousness:  Alert      Response to Learning: Progressing to goal      Endings: None Reported    Modes of Intervention: Support      Discipline Responsible: /Counselor      Signature:  J[facility]University [facility]

## 2022-07-19 NOTE — GROUP NOTE
Group Therapy Note    Date: 2022    Group Start Time:   Group End Time:   Group Topic: Recreational    [facility] Z[facility]ANE Mandujano RN        Group Therapy Note    Attendees: 6         Patient's Goal:  ***    Notes:  ***    Status After Intervention:  {Status After Intervention:424017246}    Participation Level: {Participation Level:797334892}    Participation Quality: {[facility] PARTICIPATION QUALITY:988422098}      Speech:  {ED  CD_SPEECH:63708}      Thought Process/Content: {Thought Process/Content:139199766}      Affective Functioning: {Affective Functionin}      Mood: {Mood:406537143}      Level of consciousness:  {Level of consciousness:780239912}      Response to Learnin Myah Sherman [facility] Responses to Learnin}      Endings: {[facility] Endings:20156}    Modes of Intervention: {[facility] Modes of Intervention:162803904}      Discipline Responsible: Calvin Sherman [facility] Multidisciplinary:097691168}      Signature:  Gretchen Mandujano RN

## 2022-07-19 NOTE — GROUP NOTE
Group Therapy Note    Date: 7/18/2022    Group Start Time: 1950  Group End Time: 2015  Group Topic: Recreational    [facility]    Rafael Fonseca RN        Group Therapy Note    Attendees: 6       Patient's Goal:  To attend group    Notes:  Good participation    Status After Intervention:  Unchanged    Participation Level:  Active Listener    Participation Quality: Appropriate      Speech:  normal      Thought Process/Content: Logical      Affective Functioning: Congruent      Mood: euthymic      Level of consciousness:  Alert      Response to Learning: Progressing to goal      Endings: None Reported    Modes of Intervention: Education      Discipline Responsible: Registered Nurse      Signature:  Rafael Fonseca RN

## 2022-07-19 NOTE — GROUP NOTE
Group Therapy Note    Date: 7/19/2022    Group Start Time: 1300  Group End Time: 1330  Group Topic: Healthy Living/Wellness    [facility] Benna Severe, RN; Nelly Nelson RN        Group Therapy Note    Attendees: 7/17       Patient's Goal:  To attend healthy living group    Notes:  Pt appropriate/attentive    Status After Intervention:  Improved    Participation Level:  Active Listener and Interactive    Participation Quality: Appropriate and Attentive      Speech:  normal      Thought Process/Content: Logical  Linear      Affective Functioning: Congruent      Mood: anxious      Level of consciousness:  Alert, Oriented x4, and Attentive      Response to Learning: Able to verbalize current knowledge/experience, Able to verbalize/acknowledge new learning, and Able to retain information      Endings: None Reported    Modes of Intervention: Education, Support, and Socialization      Discipline Responsible: Registered Nurse      Signature:  Nelly Nelson RN

## 2022-07-19 NOTE — PROGRESS NOTES
Morning 2 [facility] Natalie attended the morning community meeting on 7/19/22. Topics discussed today     [x] Introduction  Day of the week and date  Mask distribution  Current mask requirements  [x]Teams  Explanation of  Green and Blue team criteria  Nurses assigned to each team for today  Explanation about green and blue paper  Date  Patient's Name  Patient's Nurse  Goals  [x] Visitation  Announce the visiting hours for the day  Announce which team is allowed to have visitors for the day  Review any updated Covid 19 requirements for visitors during visitation  Vaccine Card or negative Covid test within 48 hours of visit  State Identification  Patients are reminded to alert the  at least 1 hour before visitation   [x] Unit Orientation  Coffee use  Phone location and etiquette  Shower locations  [employer] dry[employer] location and process  Common area expectations  Staff rounds expectation  [x] Meals   Educate patient to the menu  The patient is encouraged to fill out the menu to get preferences at mealtime  The patient is educated that if they do not fill out the menu, they will get the standard tray  The coffee pot is decaf, patient encouraged to order regular coffee from menu.   Educate patient to the meal process  Patient encouraged to eat snacks provided twice daily  Snacks may stay in patient room     [x] Discharge Process  Discharge expectations  Fill out the survey after discharge   [x] Hygiene  Daily showers encouraged  Showers availability discussed   Daily dressing encouraged  Discussed wearing street clothing  Education provided on where to place linens and clothing  Linens in the hamper  personal clothing does not go into the linen hamper  [x] Group   Patient encouraged to attend group provided  Time of Group Meetings discussed  Gentle reminder that attendance is a Physician order  [x] Movement  Chair exercises completed  Stretching completed  Notes:  Goal - \"To survive\" Electronically signed by Carlo Castellano, [address] on 7/19/2022 at 12:24 PM

## 2022-07-19 NOTE — GROUP NOTE
Group Therapy Note    Date: 7/18/2022    Group Start Time: 1930  Group End Time: 1950  Group Topic: Healthy Living/Wellness    [facility]    Cuba Carranza RN        Group Therapy Note    Attendees: 7       Patient's Goal:  To attend group    Notes:  Good participation    Status After Intervention:  Unchanged    Participation Level:  Active Listener    Participation Quality: Appropriate      Speech:  normal      Thought Process/Content: Logical      Affective Functioning: Congruent      Mood: euthymic      Level of consciousness:  Alert      Response to Learning: Progressing to goal      Endings: None Reported    Modes of Intervention: Support      Discipline Responsible: Registered Nurse      Signature:  Cuba Carranza RN

## 2022-07-19 NOTE — GROUP NOTE
Group Therapy Note    Date: 7/19/2022    Group Start Time: 1630  Group End Time: 1700  Group Topic: Healthy Living/Wellness    [facility]    Eligio Verduzco RN        Group Therapy Note    Attendees: 12       Patient's Goal:  To attend group    Notes:  Good participation    Status After Intervention:  Unchanged    Participation Level:  Active Listener    Participation Quality: Appropriate      Speech:  normal      Thought Process/Content: Logical      Affective Functioning: Congruent      Mood: euthymic      Level of consciousness:  Alert      Response to Learning: Progressing to goal      Endings: None Reported    Modes of Intervention: Support      Discipline Responsible: Registered Nurse      Signature:  Eligio Verduzco RN

## 2022-07-19 NOTE — PROGRESS NOTES
Pt. refused to attend the 1000 skills group, despite staff encouragement.  Electronically signed by Jaime Rosales, [address] on 7/19/2022 at 12:25 PM

## 2022-07-19 NOTE — GROUP NOTE
Group Therapy Note    Date: 7/18/2022    Group Start Time: 2015  Group End Time: 2030  Group Topic: Wrap-Up    [facility]    Mejia Pagan RN        Group Therapy Note    Attendees: 6       Patient's Goal:  To stay calm    Notes:  Progressing towards goal    Status After Intervention:  Unchanged    Participation Level:  Active Listener    Participation Quality: Appropriate      Speech:  normal      Thought Process/Content: Logical      Affective Functioning: Congruent      Mood: euthymic      Level of consciousness:  Alert      Response to Learning: Progressing to goal      Endings: None Reported    Modes of Intervention: Socialization      Discipline Responsible: Registered Nurse      Signature:  Mejia Pagan RN

## 2022-07-19 NOTE — GROUP NOTE
Group Therapy Note    Date: 7/19/2022    Group Start Time: 3213  Group End Time: 6224  Group Topic: Cognitive Skills    [facility]    IAM Stoner        Group Therapy Note    Attendees: 10/16       Patient's Goal:  to participate in progressive muscle relaxation     Notes:  n/a     Status After Intervention:  Improved    Participation Level:  Active Listener and Interactive    Participation Quality: Appropriate and Attentive      Speech:  normal      Thought Process/Content: Logical      Affective Functioning: Flat      Mood: anxious and depressed      Level of consciousness:  Alert      Response to Learning: Progressing to goal      Endings: None Reported    Modes of Intervention: Education      Discipline Responsible: /Counselor      Signature:  IAM Stoner

## 2022-07-19 NOTE — PROGRESS NOTES
Patient visible on unit, social with peers. Patient pleasant and cooperative. Bright affect. Pt makes grandiose and paranoid statements at times. Pt states he recently won the lottery and won \"a large amount. \" Pt states people he thought were family and friends were hacking his account and taking his money. Pt states he is too generous and has given cars and money to \"beautiful women\" because he wanted \"sex. \" Pt speaks of wanting to make healthy choices and lose weight. Pt reports good sleep and appetite. Pt denies SI, HI, and Hallucinations.  Electronically signed by Daisha Olivas RN on 7/19/22 at 5:48 PM EDT

## 2022-07-20 PROCEDURE — 6370000000 HC RX 637 (ALT 250 FOR IP): Performed by: STUDENT IN AN ORGANIZED HEALTH CARE EDUCATION/TRAINING PROGRAM

## 2022-07-20 PROCEDURE — 1240000000 HC EMOTIONAL WELLNESS R&B

## 2022-07-20 PROCEDURE — 99232 SBSQ HOSP IP/OBS MODERATE 35: CPT | Performed by: STUDENT IN AN ORGANIZED HEALTH CARE EDUCATION/TRAINING PROGRAM

## 2022-07-20 PROCEDURE — 6370000000 HC RX 637 (ALT 250 FOR IP): Performed by: INTERNAL MEDICINE

## 2022-07-20 RX ADMIN — METOPROLOL SUCCINATE 25 MG: 25 TABLET, EXTENDED RELEASE ORAL at 09:40

## 2022-07-20 RX ADMIN — ASPIRIN 81 MG: 81 TABLET, COATED ORAL at 09:40

## 2022-07-20 RX ADMIN — RISPERIDONE 3 MG: 2 TABLET, ORALLY DISINTEGRATING ORAL at 20:27

## 2022-07-20 RX ADMIN — ACETAMINOPHEN 650 MG: 325 TABLET ORAL at 09:40

## 2022-07-20 RX ADMIN — FLUOCINONIDE: 0.5 CREAM TOPICAL at 12:14

## 2022-07-20 RX ADMIN — FLUOCINONIDE: 0.5 CREAM TOPICAL at 20:27

## 2022-07-20 RX ADMIN — ATORVASTATIN CALCIUM 40 MG: 40 TABLET, FILM COATED ORAL at 20:27

## 2022-07-20 RX ADMIN — MICONAZOLE NITRATE: 2 POWDER TOPICAL at 12:14

## 2022-07-20 RX ADMIN — LOSARTAN POTASSIUM 100 MG: 50 TABLET, FILM COATED ORAL at 09:40

## 2022-07-20 RX ADMIN — MICONAZOLE NITRATE: 2 POWDER TOPICAL at 20:27

## 2022-07-20 RX ADMIN — RISPERIDONE 2 MG: 2 TABLET, ORALLY DISINTEGRATING ORAL at 09:41

## 2022-07-20 RX ADMIN — Medication: at 12:13

## 2022-07-20 ASSESSMENT — PAIN DESCRIPTION - LOCATION: LOCATION: BACK

## 2022-07-20 ASSESSMENT — PAIN SCALES - GENERAL
PAINLEVEL_OUTOF10: 0
PAINLEVEL_OUTOF10: 5

## 2022-07-20 ASSESSMENT — PAIN DESCRIPTION - DESCRIPTORS: DESCRIPTORS: ACHING

## 2022-07-20 ASSESSMENT — PAIN DESCRIPTION - ORIENTATION: ORIENTATION: LOWER

## 2022-07-20 NOTE — GROUP NOTE
Group Therapy Note    Date: 7/19/2022    Group Start Time: 1930  Group End Time: 1950  Group Topic: Recreational    [facility]    Funmilayo Castillo RN        Group Therapy Note    Attendees: 7       Patient's Goal:  To attend group    Notes:  Good participation    Status After Intervention:  Unchanged    Participation Level:  Active Listener    Participation Quality: Appropriate      Speech:  normal      Thought Process/Content: Logical      Affective Functioning: Congruent      Mood: euthymic      Level of consciousness:  Alert      Response to Learning: Progressing to goal      Endings: None Reported    Modes of Intervention: Support      Discipline Responsible: Registered Nurse      Signature:  Funmilayo Castillo RN

## 2022-07-20 NOTE — PROGRESS NOTES
Pt is noted up on the unit , ate a good breakfast, took short nap, explained and gave all am meds, tylenol for back and left hip pain was given. Pt denied any voices,depression, anxiety or suicidal thoughts, pt expressed he smokes MJ and expressed concerns of it being laced, pt stated he might have to quit it, reports he drinks 6 pack every 2 weeks or so. Pt reports sleeping good, denies any other complaints, groups were encouraged, reports he won the lottery 1 time 7 million dollars and spread to the needy.

## 2022-07-20 NOTE — GROUP NOTE
Group Therapy Note    Date: 7/20/2022    Group Start Time: 1300  Group End Time: 1737  Group Topic: Healthy Living/Wellness    [facility]    Christina Mallory RN        Group Therapy Note    Attendees: 9       Patient's Goal:  Learn and explore more about coping skills. Notes:  Pt actively and appropriately participated in group. Status After Intervention:  Unchanged    Participation Level:  Active Listener and Interactive    Participation Quality: Appropriate, Attentive, Sharing, and Supportive      Speech:  normal      Thought Process/Content: Logical  Linear      Affective Functioning: Congruent      Mood: euthymic      Level of consciousness:  Alert and Oriented x4      Response to Learning: Able to verbalize current knowledge/experience, Able to verbalize/acknowledge new learning, and Able to retain information      Endings: None Reported    Modes of Intervention: Education, Support, Socialization, and Exploration      Discipline Responsible: Registered Nurse      Signature:  Christina Mallory RN

## 2022-07-20 NOTE — GROUP NOTE
Group Therapy Note    Date: 7/20/2022    Group Start Time: 9267  Group End Time: 3020  Group Topic: Healthy Living/Wellness    [facility]    Mony Burrell RN        Group Therapy Note    Attendees: 9       Patient's Goal:  Attend group    Notes:  progressing towards goal    Status After Intervention:  Unchanged    Participation Level:  Active Listener    Participation Quality: Appropriate      Speech:  normal      Thought Process/Content: Logical      Affective Functioning: Congruent      Mood: euthymic      Level of consciousness:  Alert      Response to Learning: Progressing to goal      Endings: None Reported    Modes of Intervention: Support      Discipline Responsible: Registered Nurse      Signature:  Mony Burrell RN

## 2022-07-20 NOTE — PROGRESS NOTES
Behavioral Services                                              Medicare Re-Certification    I certify that the inpatient psychiatric hospital services furnished since the previous certification/re-certification were, and continue to be, medically necessary for;    [x] (1) Treatment which could reasonably be expected to improve the patient's condition,    [x] (2) Or for diagnostic study. Estimated length of stay/service 3 days     Plan for post-hospital care: Outpatient     This patient continues to need, on a daily basis, active treatment furnished directly by or requiring the supervision of inpatient psychiatric personnel.     Electronically signed by Orlando Thomas MD on 7/20/2022 at 12:45 PM

## 2022-07-20 NOTE — CARE COORDINATION
Group Therapy Note    Date: 7/20/2022  Start Time: 1400  End Time:  1430    Number of Participants: 6    Type of Group: Cognitive Skills    Patient's Goal:  To participate in mood management group. Notes: Patient declined to attend psychoeducation group at 1400 despite encouragement by staff.      Discipline Responsible: /Counselor    TIAGO Richardson

## 2022-07-20 NOTE — PROGRESS NOTES
Patient visible on unit, social with peers at times. Pt pleasant and cooperative. Pt has bright affect. Pt states his mood is \"good. \" Pt denies SI, HI, and Hallucinations. Pt has Lets Get Real packet, states he doesn't need help because he doesn't have a problem. Pt states he drinks alcohol at times, but not daily. Pt also reports using marijuana at times. Pt states he wants to stop using marijuana because it could be laced with something.  Electronically signed by Mohan Thornton RN on 7/20/22 at 4:17 PM EDT

## 2022-07-20 NOTE — GROUP NOTE
Group Therapy Note    Date: 7/20/2022    Group Start Time: 0908  Group End Time: 0920  Group Topic: [id number] [facility] [place]        Group Therapy Note    Attendees: 11/17         Morning Community Meeting Topics    Adriane Jeter attended the morning community meeting on 7/20/22. Topics discussed today     [x] Introduction  Day of the week and date  Mask distribution  Current mask requirements  [x]Teams  Explanation of  Green and Blue team criteria  Nurses assigned to each team for today  Explanation about green and blue paper  Date  Patient's Name  Patient's Nurse  Goals  [x] Visitation  Announce the visiting hours for the day  Announce which team is allowed to have visitors for the day  Review any updated Covid 19 requirements for visitors during visitation  Vaccine Card or negative Covid test within 48 hours of visit  State Identification  Patients are reminded to alert the  at least 1 hour before visitation   [x] Unit Orientation  Coffee use  Phone location and etiquette  Shower locations  [place]erndon and dryer location and process  Common area expectations  Staff rounds expectation  [x] Meals   Educate patient to the menu  The patient is encouraged to fill out the menu to get preferences at mealtime  The patient is educated that if they do not fill out the menu, they will get the standard tray  The coffee pot is decaf, patient encouraged to order regular coffee from menu.   Educate patient to the meal process  Patient encouraged to eat snacks provided twice daily  Snacks may stay in patient room     [x] Discharge Process  Discharge expectations  Fill out the survey after discharge   [x] Hygiene  Daily showers encouraged  Showers availability discussed   Daily dressing encouraged  Discussed wearing street clothing  Education provided on where to place linens and clothing  Linens in the hamper  personal clothing does not go into the linen hamper  [x] Group   Patient encouraged to attend group provided  Time of Group Meetings discussed  Gentle reminder that attendance is a Physician order  [x] Movement  Chair exercises completed  Stretching completed  Notes:     Signature:  Shala Choudhary

## 2022-07-20 NOTE — PROGRESS NOTES
[facility] FOLLOW-UP NOTE       7/20/2022     Patient was seen and examined in person, Chart reviewed   Patient's case discussed with staff/team    Chief Complaint: Paranoia     Interim History:   Patient reported that he's not sure if he will feel paranoid if he is disharged. He reported that risperdal is helping and is attending groups. Currently denies SI has no plan. Denies AVH. Appetite:  [x] Normal/Unchanged  [] Increased  [] Decreased      Sleep:       [x] Normal/Unchanged  [] Fair       [] Poor              Energy:    [x] Normal/Unchanged  [] Increased  [] Decreased        SI [] Present  [x] Absent    HI  []Present  [x] Absent     Aggression:  [] yes  [x] no    Patient is [x] able  [] unable to CONTRACT FOR SAFETY     PAST MEDICAL/PSYCHIATRIC HISTORY:   Past Medical History:   Diagnosis Date    Adult BMI >=70 kg/sq m ([facility]     Essential hypertension, malignant        FAMILY/SOCIAL HISTORY:  History reviewed. No pertinent family history.   Social History     Socioeconomic History    Marital status: Single     Spouse name: Not on file    Number of children: Not on file    Years of education: Not on file    Highest education level: Not on file   Occupational History    Not on file   Tobacco Use    Smoking status: Never    Smokeless tobacco: Never   Vaping Use    Vaping Use: Never used   Substance and Sexual Activity    Alcohol use: Yes     Comment: occasionally    Drug use: Yes     Types: Marijuana Champ Cue)     Comment: occasionally    Sexual activity: Not on file   Other Topics Concern    Not on file   Social History Narrative    Not on file     Social Determinants of Health     Financial Resource Strain: Not on file   Food Insecurity: Not on file   Transportation Needs: Not on file   Physical Activity: Not on file   Stress: Not on file   Social Connections: Not on file   Intimate Partner Violence: Not on file   Housing Stability: Not on file           ROS:  [x] All **OR** haloperidol lactate (HALDOL) injection 5 mg, 5 mg, IntraMUSCular, Q6H PRN, Pallavi Mckeon MD      Examination:  /69   Pulse 77   Temp 97.5 °F (36.4 °C)   Resp 18   Ht 5' 10\" (1.778 m)   Wt (!) 428 lb (194.1 kg)   SpO2 97%   BMI 61.41 kg/m²   Gait -  wide based   Medication side effects(SE): Denies     Mental Status Examination:    Level of consciousness:  within normal limits   Appearance:  fair grooming and fair hygiene  Behavior/Motor:  no abnormalities noted  Attitude toward examiner:  guarded  Speech:  normal rate and normal volume   Mood: still paranoid   Affect:  mood congruent  Thought processes:  coherent   Thought content:  Suicidal Ideation:  denies suicidal ideation, without plan, and without intent  Delusions:  no evidence of delusions  Perceptual Disturbance:  denies any perceptual disturbance  Cognition:  oriented to person, place, and time   Concentration intact  Insight improving   Judgement improving     ASSESSMENT:   Patient symptoms are:  [] Well controlled  [x] Improving  [] Worsening  [] No change      Diagnosis:   Principal Problem:    [facility]  Resolved Problems:    * No resolved hospital problems. *      LABS:    No results for input(s): WBC, HGB, PLT in the last 72 hours. No results for input(s): NA, K, CL, CO2, BUN, CREATININE, GLUCOSE in the last 72 hours. No results for input(s): BILITOT, ALKPHOS, AST, ALT in the last 72 hours.   Lab Results   Component Value Date/Time    LABAMPH Neg 07/12/2022 03:00 AM    BARBSCNU Neg 07/12/2022 03:00 AM    LABBENZ Neg 07/12/2022 03:00 AM    LABMETH Neg 07/12/2022 03:00 AM    OPIATESCREENURINE Neg 07/12/2022 03:00 AM    PHENCYCLIDINESCREENURINE Neg 07/12/2022 03:00 AM    ETOH <10 07/12/2022 04:34 AM     Lab Results   Component Value Date/Time    TSH 1.760 07/12/2022 04:34 AM     No results found for: LITHIUM  No results found for: VALPROATE, CBMZ    RISK ASSESSMENT:  Denies SI with an intent or plan     Treatment Plan:  Reviewed current Medications with the patient. Increased risperdal m tabs to 3 mg BID   Risks, benefits, side effects, drug-to-drug interactions and alternatives to treatment were discussed. Collateral information: Father  CD evaluation  Encourage patient to attend group and other milieu activities.   Discharge planning discussed with the patient and treatment team.    PSYCHOTHERAPY/COUNSELING:  [x] Therapeutic interview  [x] Supportive  [] CBT  [] Ongoing  [] Other    [x] Patient continues to need, on a daily basis, active treatment furnished directly by or requiring the supervision of inpatient psychiatric personnel      Anticipated Length of stay: 2-3 days       Electronically signed by Darlene Li MD on 7/20/2022 at 12:43 PM

## 2022-07-21 VITALS
OXYGEN SATURATION: 97 % | TEMPERATURE: 97.6 F | WEIGHT: 315 LBS | RESPIRATION RATE: 20 BRPM | HEART RATE: 79 BPM | BODY MASS INDEX: 45.1 KG/M2 | HEIGHT: 70 IN | SYSTOLIC BLOOD PRESSURE: 113 MMHG | DIASTOLIC BLOOD PRESSURE: 57 MMHG

## 2022-07-21 PROCEDURE — 6370000000 HC RX 637 (ALT 250 FOR IP): Performed by: STUDENT IN AN ORGANIZED HEALTH CARE EDUCATION/TRAINING PROGRAM

## 2022-07-21 PROCEDURE — 99239 HOSP IP/OBS DSCHRG MGMT >30: CPT | Performed by: STUDENT IN AN ORGANIZED HEALTH CARE EDUCATION/TRAINING PROGRAM

## 2022-07-21 PROCEDURE — 6370000000 HC RX 637 (ALT 250 FOR IP): Performed by: INTERNAL MEDICINE

## 2022-07-21 RX ORDER — RISPERIDONE 3 MG/1
3 TABLET, ORALLY DISINTEGRATING ORAL 2 TIMES DAILY
Qty: 28 TABLET | Refills: 2 | Status: SHIPPED | OUTPATIENT
Start: 2022-07-21 | End: 2022-08-04

## 2022-07-21 RX ADMIN — RISPERIDONE 3 MG: 2 TABLET, ORALLY DISINTEGRATING ORAL at 09:27

## 2022-07-21 RX ADMIN — FLUOCINONIDE: 0.5 CREAM TOPICAL at 11:47

## 2022-07-21 RX ADMIN — ACETAMINOPHEN 650 MG: 325 TABLET ORAL at 09:27

## 2022-07-21 RX ADMIN — Medication: at 11:46

## 2022-07-21 RX ADMIN — LOSARTAN POTASSIUM 100 MG: 50 TABLET, FILM COATED ORAL at 11:52

## 2022-07-21 RX ADMIN — ASPIRIN 81 MG: 81 TABLET, COATED ORAL at 09:27

## 2022-07-21 RX ADMIN — MICONAZOLE NITRATE: 2 POWDER TOPICAL at 11:47

## 2022-07-21 RX ADMIN — METOPROLOL SUCCINATE 25 MG: 25 TABLET, EXTENDED RELEASE ORAL at 11:51

## 2022-07-21 ASSESSMENT — PAIN SCALES - GENERAL
PAINLEVEL_OUTOF10: 0
PAINLEVEL_OUTOF10: 5

## 2022-07-21 ASSESSMENT — PAIN DESCRIPTION - ORIENTATION: ORIENTATION: RIGHT

## 2022-07-21 ASSESSMENT — PAIN DESCRIPTION - LOCATION: LOCATION: SHOULDER;ELBOW

## 2022-07-21 ASSESSMENT — PAIN DESCRIPTION - DESCRIPTORS: DESCRIPTORS: ACHING

## 2022-07-21 NOTE — GROUP NOTE
Group Therapy Note    Date: 7/20/2022    Group Start Time: 2000  Group End Time: 2030  Group Topic: Recreational    [facility]    Huyen Sheehan RN        Group Therapy Note    Attendees: 11       Patient's Goal:  attend group    Notes:  Good participation     Status After Intervention:  Unchanged    Participation Level:  Active Listener    Participation Quality: Appropriate      Speech:  normal      Thought Process/Content: Logical      Affective Functioning: Congruent      Mood: euthymic      Level of consciousness:  Alert      Response to Learning: Progressing to goal      Endings: None Reported    Modes of Intervention: Socialization      Discipline Responsible: Registered Nurse      Signature:  Huyen Sheehan RN

## 2022-07-21 NOTE — GROUP NOTE
Group Therapy Note    Date: 7/20/2022    Group Start Time: 2030  Group End Time: 2045  Group Topic: Wrap-Up    [facility]    Izabel Lopez RN        Group Therapy Note    Attendees: 9       Patient's Goal:  to increase participation in groups    Notes:  progressing towards goal    Status After Intervention:  Unchanged    Participation Level:  Active Listener    Participation Quality: Appropriate      Speech:  normal      Thought Process/Content: Logical      Affective Functioning: Congruent      Mood: euthymic      Level of consciousness:  Alert      Response to Learning: Progressing to goal      Endings: None Reported    Modes of Intervention: Support      Discipline Responsible: Registered Nurse      Signature:  Izabel Lopez RN

## 2022-07-21 NOTE — CARE COORDINATION
Left voicemail for pt's father Kourtney Oconnor [phone] requesting return call to discuss pt's current discharge plan.  Electronically signed by IAM Garza on 7/21/2022 at 10:21 AM

## 2022-07-21 NOTE — PROGRESS NOTES
[address] [facility] NOTE       7/21/2022     Patient was seen and examined in person, Chart reviewed   Patient's case discussed with staff/team    Chief Complaint: Paranoia     Interim History:     Patient reported that he is doing better today and is ready for discharge. Currently he denies suicidal ideations with an intent or plan. He Denies access to weapons and guns at home. Stated he had good sleep, and appetite. Currently denies auditory and visual hallucinations. Denies paranoid ideations at others. Currently denies homicidal ideations at others. He/she stated they have no side effects from the medications. He/she will follow up with psychiatry outpatient and go to [facility] step down unit. Stated he has very good support from father. He denies homicidal ideations at others. Appetite:  [x] Normal/Unchanged  [] Increased  [] Decreased      Sleep:       [x] Normal/Unchanged  [] Fair       [] Poor              Energy:    [x] Normal/Unchanged  [] Increased  [] Decreased        SI [] Present  [x] Absent    HI  []Present  [x] Absent     Aggression:  [] yes  [x] no    Patient is [x] able  [] unable to CONTRACT FOR SAFETY     PAST MEDICAL/PSYCHIATRIC HISTORY:   Past Medical History:   Diagnosis Date    Adult BMI >=70 kg/sq m ([facility]     Essential hypertension, malignant        FAMILY/SOCIAL HISTORY:  History reviewed. No pertinent family history.   Social History     Socioeconomic History    Marital status: Single     Spouse name: Not on file    Number of children: Not on file    Years of education: Not on file    Highest education level: Not on file   Occupational History    Not on file   Tobacco Use    Smoking status: Never    Smokeless tobacco: Never   Vaping Use    Vaping Use: Never used   Substance and Sexual Activity    Alcohol use: Yes     Comment: occasionally    Drug use: Yes     Types: Marijuana Ana Mustard)     Comment: occasionally    Sexual activity: Not on file Other Topics Concern    Not on file   Social History Narrative    Not on file     Social Determinants of Health     Financial Resource Strain: Not on file   Food Insecurity: Not on file   Transportation Needs: Not on file   Physical Activity: Not on file   Stress: Not on file   Social Connections: Not on file   Intimate Partner Violence: Not on file   Housing Stability: Not on file           ROS:  [x] All negative/unchanged except if checked. Explain positive(checked items) below:  [] Constitutional  [] Eyes  [] Ear/Nose/Mouth/Throat  [] Respiratory  [] CV  [] GI  []   [] Musculoskeletal  [] Skin/Breast  [] Neurological  [] Endocrine  [] Heme/Lymph  [] Allergic/Immunologic    Explanation:     MEDICATIONS:  No current facility-administered medications for this encounter. Current Outpatient Medications:     risperiDONE (RISPERDAL M-TABS) 3 MG disintegrating tablet, Take 1 tablet by mouth in the morning and 1 tablet before bedtime. Do all this for 14 days. , Disp: 28 tablet, Rfl: 2    Skin Protectants, Misc. (EUCERIN) cream, Apply topically as needed for Dry Skin Apply topically as needed. , Disp: , Rfl:     losartan (COZAAR) 100 MG tablet, Take 100 mg by mouth daily, Disp: , Rfl:     atorvastatin (LIPITOR) 40 MG tablet, Take 40 mg by mouth nightly, Disp: , Rfl:     aspirin 81 MG EC tablet, Take 81 mg by mouth daily, Disp: , Rfl:     metoprolol succinate (TOPROL XL) 25 MG extended release tablet, Take 25 mg by mouth daily, Disp: , Rfl:     fluocinonide (LIDEX) 0.05 % cream, Apply topically 2 times daily Apply topically 2 times daily. , Disp: , Rfl:       Examination:  BP (!) 113/57   Pulse 79   Temp 97.6 °F (36.4 °C) (Oral)   Resp 20   Ht 5' 10\" (1.778 m)   Wt (!) 428 lb (194.1 kg)   SpO2 97%   BMI 61.41 kg/m²   Gait -  wide based   Medication side effects(SE): Denies           Mental Status Examination:    Level of consciousness:  within normal limits   Appearance:  good grooming and good hygiene  Behavior/Motor:  no abnormalities noted  Attitude toward examiner:  cooperative  Speech:  normal rate and normal volume   Mood: euthymic  Affect:  mood congruent  Thought processes:  linear and goal directed   Thought content: Denies Homocidal ideation at others  Suicidal Ideation:  denies suicidal ideation, without plan, and without intent  Perceptual Disturbance:  denies any perceptual disturbance  Cognition:  oriented to person, place, and time   Concentration intact  Insight good   Judgement good           ASSESSMENT:   Patient symptoms are:  [x] Well controlled  [x] Improving  [] Worsening  [] No change      Diagnosis:   Principal Problem:    Psychosis ([facility]  Resolved Problems:    * No resolved hospital problems. *      LABS:    No results for input(s): WBC, HGB, PLT in the last 72 hours. No results for input(s): NA, K, CL, CO2, BUN, CREATININE, GLUCOSE in the last 72 hours. No results for input(s): BILITOT, ALKPHOS, AST, ALT in the last 72 hours. Lab Results   Component Value Date/Time    LABAMPH Neg 07/12/2022 03:00 AM    BARBSCNU Neg 07/12/2022 03:00 AM    LABBENZ Neg 07/12/2022 03:00 AM    LABMETH Neg 07/12/2022 03:00 AM    OPIATESCREENURINE Neg 07/12/2022 03:00 AM    PHENCYCLIDINESCREENURINE Neg 07/12/2022 03:00 AM    ETOH <10 07/12/2022 04:34 AM     Lab Results   Component Value Date/Time    TSH 1.760 07/12/2022 04:34 AM     No results found for: LITHIUM  No results found for: VALPROATE, CBMZ    RISK ASSESSMENT:  Denies SI with an intent or plan     Treatment Plan:  Reviewed current Medications with the patient. Increased risperdal m tabs to 3 mg BID   Risks, benefits, side effects, drug-to-drug interactions and alternatives to treatment were discussed. Collateral information: Father  CD evaluation  Encourage patient to attend group and other milieu activities.   Discharge planning discussed with the patient and treatment team.    PSYCHOTHERAPY/COUNSELING:  [x] Therapeutic interview  [x] Supportive  [] CBT  [] Ongoing  [] Other    [] Patient continues to need, on a daily basis, active treatment furnished directly by or requiring the supervision of inpatient psychiatric personnel      Anticipated Length of stay: 2-3 days       Electronically signed by Иван Bergman MD on 7/21/2022 at 3:26 PM

## 2022-07-21 NOTE — DISCHARGE INSTRUCTIONS
Keep all follow up appointments, take medications as ordered, utilize positive supports, abstain from use of alcohol and drugs. If symptoms return or you feel at risk to yourself or others, please call 911, return the nearest emergency room, or call your local crisis hotline:  [facility]: [phone] [place]: [phone]: [phone]     Due to the [address] [facility]oking [facility]essation [facility] is not currently available. For assistance with quitting smoking please go to https://smokefree.gov. A prescription for an FDA-approved tobacco cessation medication was offered at discharge and the patient refused.

## 2022-07-21 NOTE — PROGRESS NOTES
Explained and gave all am meds except cozaar and  toprol xl due to lower Bp, 97/49, pt denied dizziness, denied any depression anxiety or suicidal thoughts, pt reports his thoughts are clear ,denied voices .

## 2022-07-21 NOTE — PROGRESS NOTES
Discharge instructions reviewed verbally and in writing including f/u appointments. Patient verbalizes understanding and signed as such. Belongings reviewed and accounted for. Security on unit and counted cash with pt. Patient denies SI, HI, A/V hallucinations, mood is stable. Pt to discharge to [facility].

## 2022-07-21 NOTE — GROUP NOTE
Group Therapy Note    Date: 7/21/2022    Group Start Time: 1000  Group End Time: 1100  Group Topic: Art Therapy     [facility]    Anjali Friedman        Group Therapy Note    Attendees: 9/14       Patient's Goal:  \"finish up my drawing, maybe go home\"    Notes:  Patient worked on task with focus.  Pt was social    Status After Intervention:  Improved    Participation Level: Interactive    Participation Quality: Appropriate and Attentive      Speech:  normal      Thought Process/Content: Logical      Affective Functioning: Congruent      Mood: euthymic      Level of consciousness:  Alert and Attentive      Response to Learning: Progressing to goal      Endings: None Reported    Modes of Intervention: Activity      Discipline Responsible: [address] [facility]      Signature:  Anjali Friedman

## 2022-07-21 NOTE — DISCHARGE SUMMARY
DISCHARGE SUMMARY      Patient ID:  Patricia Duvall  [id number]  59 y.o.  1960      Admit date: 7/12/2022    Discharge date and time: 7/21/2022    Admitting Physician: Genesis Sandoval MD     Discharge Physician: Dr Josse Banegas MD    Admission Diagnoses: Psychosis, unspecified psychosis type [facility]) [F29]    Admission Condition: poor    Discharged Condition: stable    Admission Circumstance:     Per ED note,   Risk Factors: Poor insight & judgement  Clinical Summary:  Presented via squad for altered mental status, while Patient was being registered he became physically aggressive with Staff & then lunged @ [place] police as they tried to intervene. Patient was placed in restraints & given medications for agitation. Patient appears internally preoccupied with thought blocking noted. Patient is slow to respond to questions asked, but does deny suicidal & homocidal ideation. Denies A/V hallucinations. Patient is paranoid. Patient is unkempt & does not appear to be attending to his ADL's. PAST MEDICAL/PSYCHIATRIC HISTORY:   Past Medical History:   Diagnosis Date    Adult BMI >=70 kg/sq m [facility])     Essential hypertension, malignant        FAMILY/SOCIAL HISTORY:  History reviewed. No pertinent family history.   Social History     Socioeconomic History    Marital status: Single     Spouse name: Not on file    Number of children: Not on file    Years of education: Not on file    Highest education level: Not on file   Occupational History    Not on file   Tobacco Use    Smoking status: Never    Smokeless tobacco: Never   Vaping Use    Vaping Use: Never used   Substance and Sexual Activity    Alcohol use: Yes     Comment: occasionally    Drug use: Yes     Types: Marijuana [facility])     Comment: occasionally    Sexual activity: Not on file   Other Topics Concern    Not on file   Social History Narrative    Not on file     Social Determinants of Health     Financial Resource Strain: Not on file   Food Insecurity: Not on file Transportation Needs: Not on file   Physical Activity: Not on file   Stress: Not on file   Social Connections: Not on file   Intimate Partner Violence: Not on file   Housing Stability: Not on file       MEDICATIONS:  No current facility-administered medications for this encounter. Current Outpatient Medications:     risperiDONE (RISPERDAL M-TABS) 3 MG disintegrating tablet, Take 1 tablet by mouth in the morning and 1 tablet before bedtime. Do all this for 14 days. , Disp: 28 tablet, Rfl: 2    Skin Protectants, Misc. (EUCERIN) cream, Apply topically as needed for Dry Skin Apply topically as needed. , Disp: , Rfl:     losartan (COZAAR) 100 MG tablet, Take 100 mg by mouth daily, Disp: , Rfl:     atorvastatin (LIPITOR) 40 MG tablet, Take 40 mg by mouth nightly, Disp: , Rfl:     aspirin 81 MG EC tablet, Take 81 mg by mouth daily, Disp: , Rfl:     metoprolol succinate (TOPROL XL) 25 MG extended release tablet, Take 25 mg by mouth daily, Disp: , Rfl:     fluocinonide (LIDEX) 0.05 % cream, Apply topically 2 times daily Apply topically 2 times daily. , Disp: , Rfl:     Examination:  BP (!) 113/57   Pulse 79   Temp 97.6 °F (36.4 °C) (Oral)   Resp 20   Ht 5' 10\" (1.778 m)   Wt (!) 428 lb (194.1 kg)   SpO2 97%   BMI 61.41 kg/m²   Gait - steady    HOSPITAL COURSE[de-identified]  Following admission to the hospital, patient had a complete physical exam and blood work up  Patient was monitored closely with suicide precaution  Patient was started on risperdal m tabs and finally titrated to 3 mg BID. Improved mood, and denied any paranoid thoughts. Was encouraged to participate in group and other milieu activity  Patient started to feel better with this combination of treatment. Significant progress in the symptoms since admission.     Mood better, with the score of 0/10 - bad  Denies AVH or paranoid thoughts  Denies Hopeless or worthless feeling  No active SI/HI  Appetite:  [x] Normal  [] Increased  [] Decreased    Sleep:       [x] Normal  [] Fair       [] Poor            Energy:    [x] Normal  [] Increased  [] Decreased     SI [] Present  [x] Absent  HI  []Present  [x] Absent   Aggression:  [] yes  [] no  Patient is [x] able  [] unable to CONTRACT FOR SAFETY   Medication side effects(SE):  [x] None(Psych. Meds.) [] Other    Mental Status Examination:    Level of consciousness:  within normal limits   Appearance:  good grooming and good hygiene  Behavior/Motor:  no abnormalities noted  Attitude toward examiner:  cooperative  Speech:  normal rate and normal volume   Mood: euthymic  Affect:  mood congruent  Thought processes:  linear and goal directed   Thought content: Denies Homocidal ideation at others  Suicidal Ideation:  denies suicidal ideation, without plan, and without intent  Perceptual Disturbance:  denies any perceptual disturbance  Cognition:  oriented to person, place, and time   Concentration intact  Insight good   Judgement good           ASSESSMENT:  Patient symptoms are:  [x] Well controlled  [x] Improving  [] Worsening  [] No change      Diagnosis:  Principal Problem:    [facility]  Resolved Problems:    * No resolved hospital problems. *      LABS:    No results for input(s): WBC, HGB, PLT in the last 72 hours. No results for input(s): NA, K, CL, CO2, BUN, CREATININE, GLUCOSE in the last 72 hours. No results for input(s): BILITOT, ALKPHOS, AST, ALT in the last 72 hours. Lab Results   Component Value Date/Time    LABAMPH Neg 07/12/2022 03:00 AM    BARBSCNU Neg 07/12/2022 03:00 AM    LABBENZ Neg 07/12/2022 03:00 AM    LABMETH Neg 07/12/2022 03:00 AM    OPIATESCREENURINE Neg 07/12/2022 03:00 AM    PHENCYCLIDINESCREENURINE Neg 07/12/2022 03:00 AM    ETOH <10 07/12/2022 04:34 AM     Lab Results   Component Value Date/Time    TSH 1.760 07/12/2022 04:34 AM     No results found for: LITHIUM  No results found for: VALPROATE, CBMZ    RISK ASSESSMENT AT DISCHARGE: Low risk for suicide and homicide.      Treatment Plan:  Reviewed

## 2022-07-21 NOTE — GROUP NOTE
Group Therapy Note    Date: 7/21/2022    Group Start Time: 0635  Group End Time: 0915  Group Topic: [id number] [facility]        Group Therapy Note    Attendees: 10/14         Morning Community Meeting Topics    Sajan Pederson attended the morning community meeting on 7/21/22. Topics discussed today     [x] Introduction  Day of the week and date  Mask distribution  Current mask requirements  [x]Teams  Explanation of  Green and Blue team criteria  Nurses assigned to each team for today  Explanation about green and blue paper  Date  Patient's Name  Patient's Nurse  Goals  [x] Visitation  Announce the visiting hours for the day  Announce which team is allowed to have visitors for the day  Review any updated Covid 19 requirements for visitors during visitation  Vaccine Card or negative Covid test within 48 hours of visit  State Identification  Patients are reminded to alert the  at least 1 hour before visitation   [x] Unit Orientation  Coffee use  Phone location and etiquette  Shower locations  Embarrass and dryer location and process  Common area expectations  Staff rounds expectation  [x] Meals   Educate patient to the menu  The patient is encouraged to fill out the menu to get preferences at mealtime  The patient is educated that if they do not fill out the menu, they will get the standard tray  The coffee pot is decaf, patient encouraged to order regular coffee from menu.   Educate patient to the meal process  Patient encouraged to eat snacks provided twice daily  Snacks may stay in patient room     [x] Discharge Process  Discharge expectations  Fill out the survey after discharge   [x] Hygiene  Daily showers encouraged  Showers availability discussed   Daily dressing encouraged  Discussed wearing street clothing  Education provided on where to place linens and clothing  Linens in the hamper  personal clothing does not go into the linen hamper  [x] Group   Patient encouraged to attend group provided  Time of Group Meetings discussed  Gentle reminder that attendance is a Physician order  [x] Movement  Chair exercises completed  Stretching completed  Notes:     Signature:  Governor Zhao

## 2022-07-21 NOTE — DISCHARGE INSTR - DIET

## 2022-07-21 NOTE — GROUP NOTE
Group Therapy Note    Date: 7/21/2022    Group Start Time: 1100  Group End Time: 7049  Group Topic: Psychotherapy    [facility]    IAM Couch        Group Therapy Note    Attendees: 11/13       Patient's Goal:  did not state    Notes:  became upset and assertive towards peer who was speaking down to peers who report medication is helpful. Status After Intervention:  Improved    Participation Level:  Active Listener and Interactive    Participation Quality: Appropriate, Attentive, Sharing, and Supportive      Speech:  normal      Thought Process/Content: Logical      Affective Functioning: Flat      Mood: anxious      Level of consciousness:  Alert      Response to Learning: Progressing to goal      Endings: None Reported    Modes of Intervention: Education      Discipline Responsible: /Counselor      Signature:  IAM Couch

## 2023-05-13 ENCOUNTER — HOSPITAL ENCOUNTER (EMERGENCY)
Age: 63
Discharge: OTHER INSTITUTION | End: 2023-05-14
Attending: EMERGENCY MEDICINE
Payer: MEDICAID

## 2023-05-13 DIAGNOSIS — F29 PSYCHOSIS, UNSPECIFIED PSYCHOSIS TYPE (HCC): Primary | ICD-10-CM

## 2023-05-13 LAB
ALBUMIN SERPL-MCNC: 4 G/DL (ref 3.5–4.6)
ALP SERPL-CCNC: 97 U/L (ref 35–104)
ALT SERPL-CCNC: 33 U/L (ref 0–41)
AMPHET UR QL SCN: ABNORMAL
ANION GAP SERPL CALCULATED.3IONS-SCNC: 14 MEQ/L (ref 9–15)
APAP SERPL-MCNC: <5 UG/ML (ref 10–30)
AST SERPL-CCNC: 35 U/L (ref 0–40)
BACTERIA URNS QL MICRO: NEGATIVE /HPF
BARBITURATES UR QL SCN: ABNORMAL
BASOPHILS # BLD: 0.1 K/UL (ref 0–0.2)
BASOPHILS NFR BLD: 0.7 %
BENZODIAZ UR QL SCN: ABNORMAL
BILIRUB SERPL-MCNC: 0.6 MG/DL (ref 0.2–0.7)
BILIRUB UR QL STRIP: NEGATIVE
BUN SERPL-MCNC: 23 MG/DL (ref 8–23)
CALCIUM SERPL-MCNC: 9.1 MG/DL (ref 8.5–9.9)
CANNABINOIDS UR QL SCN: POSITIVE
CHLORIDE SERPL-SCNC: 99 MEQ/L (ref 95–107)
CHOLEST SERPL-MCNC: 155 MG/DL (ref 0–199)
CK SERPL-CCNC: 552 U/L (ref 0–190)
CLARITY UR: ABNORMAL
CO2 SERPL-SCNC: 24 MEQ/L (ref 20–31)
COCAINE UR QL SCN: ABNORMAL
COLOR UR: YELLOW
CREAT SERPL-MCNC: 1.39 MG/DL (ref 0.7–1.2)
DRUG SCREEN COMMENT UR-IMP: ABNORMAL
EOSINOPHIL # BLD: 0.2 K/UL (ref 0–0.7)
EOSINOPHIL NFR BLD: 1.5 %
EPI CELLS #/AREA URNS AUTO: ABNORMAL /HPF (ref 0–5)
ERYTHROCYTE [DISTWIDTH] IN BLOOD BY AUTOMATED COUNT: 15.8 % (ref 11.5–14.5)
ETHANOL PERCENT: NORMAL G/DL
ETHANOLAMINE SERPL-MCNC: <10 MG/DL (ref 0–0.08)
FENTANYL SCREEN, URINE: ABNORMAL
GLOBULIN SER CALC-MCNC: 4.2 G/DL (ref 2.3–3.5)
GLUCOSE SERPL-MCNC: 113 MG/DL (ref 70–99)
GLUCOSE UR STRIP-MCNC: NEGATIVE MG/DL
HCT VFR BLD AUTO: 40.3 % (ref 42–52)
HDLC SERPL-MCNC: 38 MG/DL (ref 40–59)
HGB BLD-MCNC: 13.3 G/DL (ref 14–18)
HGB UR QL STRIP: ABNORMAL
HYALINE CASTS #/AREA URNS AUTO: ABNORMAL /HPF (ref 0–5)
KETONES UR STRIP-MCNC: 15 MG/DL
LDLC SERPL CALC-MCNC: 94 MG/DL (ref 0–129)
LEUKOCYTE ESTERASE UR QL STRIP: ABNORMAL
LYMPHOCYTES # BLD: 1.7 K/UL (ref 1–4.8)
LYMPHOCYTES NFR BLD: 15.5 %
MCH RBC QN AUTO: 29 PG (ref 27–31.3)
MCHC RBC AUTO-ENTMCNC: 33 % (ref 33–37)
MCV RBC AUTO: 87.7 FL (ref 79–92.2)
METHADONE UR QL SCN: ABNORMAL
MONOCYTES # BLD: 0.9 K/UL (ref 0.2–0.8)
MONOCYTES NFR BLD: 8.1 %
NEUTROPHILS # BLD: 8 K/UL (ref 1.4–6.5)
NEUTS SEG NFR BLD: 74.2 %
NITRITE UR QL STRIP: NEGATIVE
OPIATES UR QL SCN: ABNORMAL
OXYCODONE UR QL SCN: ABNORMAL
PCP UR QL SCN: ABNORMAL
PH UR STRIP: 5.5 [PH] (ref 5–9)
PLATELET # BLD AUTO: 247 K/UL (ref 130–400)
POTASSIUM SERPL-SCNC: 3.9 MEQ/L (ref 3.4–4.9)
PROPOXYPH UR QL SCN: ABNORMAL
PROT SERPL-MCNC: 8.2 G/DL (ref 6.3–8)
PROT UR STRIP-MCNC: 30 MG/DL
RBC # BLD AUTO: 4.59 M/UL (ref 4.7–6.1)
RBC #/AREA URNS AUTO: ABNORMAL /HPF (ref 0–5)
SALICYLATES SERPL-MCNC: <0.3 MG/DL (ref 15–30)
SARS-COV-2 RDRP RESP QL NAA+PROBE: NOT DETECTED
SODIUM SERPL-SCNC: 137 MEQ/L (ref 135–144)
SP GR UR STRIP: 1.02 (ref 1–1.03)
TRIGL SERPL-MCNC: 113 MG/DL (ref 0–150)
TSH SERPL-MCNC: 2.56 UIU/ML (ref 0.44–3.86)
UROBILINOGEN UR STRIP-ACNC: 1 E.U./DL
WBC # BLD AUTO: 10.8 K/UL (ref 4.8–10.8)
WBC #/AREA URNS AUTO: ABNORMAL /HPF (ref 0–5)

## 2023-05-13 PROCEDURE — 36415 COLL VENOUS BLD VENIPUNCTURE: CPT

## 2023-05-13 PROCEDURE — 80143 DRUG ASSAY ACETAMINOPHEN: CPT

## 2023-05-13 PROCEDURE — 80307 DRUG TEST PRSMV CHEM ANLYZR: CPT

## 2023-05-13 PROCEDURE — 85025 COMPLETE CBC W/AUTO DIFF WBC: CPT

## 2023-05-13 PROCEDURE — 87635 SARS-COV-2 COVID-19 AMP PRB: CPT

## 2023-05-13 PROCEDURE — 82550 ASSAY OF CK (CPK): CPT

## 2023-05-13 PROCEDURE — 6360000002 HC RX W HCPCS: Performed by: EMERGENCY MEDICINE

## 2023-05-13 PROCEDURE — 80061 LIPID PANEL: CPT

## 2023-05-13 PROCEDURE — 81001 URINALYSIS AUTO W/SCOPE: CPT

## 2023-05-13 PROCEDURE — 6370000000 HC RX 637 (ALT 250 FOR IP): Performed by: EMERGENCY MEDICINE

## 2023-05-13 PROCEDURE — 80053 COMPREHEN METABOLIC PANEL: CPT

## 2023-05-13 PROCEDURE — 99285 EMERGENCY DEPT VISIT HI MDM: CPT

## 2023-05-13 PROCEDURE — 82077 ASSAY SPEC XCP UR&BREATH IA: CPT

## 2023-05-13 PROCEDURE — 84443 ASSAY THYROID STIM HORMONE: CPT

## 2023-05-13 PROCEDURE — 90715 TDAP VACCINE 7 YRS/> IM: CPT | Performed by: EMERGENCY MEDICINE

## 2023-05-13 PROCEDURE — 80179 DRUG ASSAY SALICYLATE: CPT

## 2023-05-13 PROCEDURE — 90471 IMMUNIZATION ADMIN: CPT | Performed by: EMERGENCY MEDICINE

## 2023-05-13 PROCEDURE — 96372 THER/PROPH/DIAG INJ SC/IM: CPT

## 2023-05-13 PROCEDURE — 2580000003 HC RX 258

## 2023-05-13 RX ORDER — BACITRACIN ZINC 500 [USP'U]/G
OINTMENT TOPICAL ONCE
Status: COMPLETED | OUTPATIENT
Start: 2023-05-13 | End: 2023-05-13

## 2023-05-13 RX ORDER — WATER 1000 ML/1000ML
INJECTION, SOLUTION INTRAVENOUS
Status: COMPLETED
Start: 2023-05-13 | End: 2023-05-13

## 2023-05-13 RX ORDER — ZIPRASIDONE MESYLATE 20 MG/ML
20 INJECTION, POWDER, LYOPHILIZED, FOR SOLUTION INTRAMUSCULAR ONCE
Status: COMPLETED | OUTPATIENT
Start: 2023-05-13 | End: 2023-05-13

## 2023-05-13 RX ADMIN — BACITRACIN ZINC: 500 OINTMENT TOPICAL at 16:16

## 2023-05-13 RX ADMIN — ZIPRASIDONE MESYLATE 20 MG: 20 INJECTION, POWDER, LYOPHILIZED, FOR SOLUTION INTRAMUSCULAR at 18:57

## 2023-05-13 RX ADMIN — TETANUS TOXOID, REDUCED DIPHTHERIA TOXOID AND ACELLULAR PERTUSSIS VACCINE, ADSORBED 0.5 ML: 5; 2.5; 8; 8; 2.5 SUSPENSION INTRAMUSCULAR at 16:18

## 2023-05-13 RX ADMIN — WATER 1 ML: 1 INJECTION INTRAMUSCULAR; INTRAVENOUS; SUBCUTANEOUS at 18:58

## 2023-05-13 ASSESSMENT — PAIN DESCRIPTION - LOCATION: LOCATION: BACK;KNEE

## 2023-05-13 ASSESSMENT — PAIN DESCRIPTION - DESCRIPTORS: DESCRIPTORS: ACHING

## 2023-05-13 ASSESSMENT — PAIN SCALES - GENERAL: PAINLEVEL_OUTOF10: 5

## 2023-05-13 ASSESSMENT — LIFESTYLE VARIABLES
HOW MANY STANDARD DRINKS CONTAINING ALCOHOL DO YOU HAVE ON A TYPICAL DAY: PATIENT DOES NOT DRINK
HOW OFTEN DO YOU HAVE A DRINK CONTAINING ALCOHOL: NEVER

## 2023-05-13 ASSESSMENT — PAIN DESCRIPTION - ORIENTATION: ORIENTATION: LEFT;RIGHT

## 2023-05-13 ASSESSMENT — PAIN - FUNCTIONAL ASSESSMENT
PAIN_FUNCTIONAL_ASSESSMENT: 0-10
PAIN_FUNCTIONAL_ASSESSMENT: ACTIVITIES ARE NOT PREVENTED

## 2023-05-13 ASSESSMENT — PAIN DESCRIPTION - FREQUENCY: FREQUENCY: CONTINUOUS

## 2023-05-13 ASSESSMENT — PAIN DESCRIPTION - ONSET: ONSET: ON-GOING

## 2023-05-13 ASSESSMENT — PAIN DESCRIPTION - PAIN TYPE: TYPE: ACUTE PAIN

## 2023-05-14 VITALS
OXYGEN SATURATION: 92 % | WEIGHT: 315 LBS | SYSTOLIC BLOOD PRESSURE: 140 MMHG | BODY MASS INDEX: 42.66 KG/M2 | HEIGHT: 72 IN | RESPIRATION RATE: 16 BRPM | TEMPERATURE: 97.7 F | DIASTOLIC BLOOD PRESSURE: 68 MMHG | HEART RATE: 77 BPM

## 2023-05-14 ASSESSMENT — PAIN - FUNCTIONAL ASSESSMENT: PAIN_FUNCTIONAL_ASSESSMENT: NONE - DENIES PAIN

## 2023-05-19 LAB
EKG ATRIAL RATE: 74 BPM
EKG P AXIS: 51 DEGREES
EKG P-R INTERVAL: 160 MS
EKG Q-T INTERVAL: 412 MS
EKG QRS DURATION: 104 MS
EKG QTC CALCULATION (BAZETT): 457 MS
EKG R AXIS: 76 DEGREES
EKG T AXIS: 21 DEGREES
EKG VENTRICULAR RATE: 74 BPM